# Patient Record
Sex: MALE | Race: WHITE | Employment: FULL TIME | URBAN - METROPOLITAN AREA
[De-identification: names, ages, dates, MRNs, and addresses within clinical notes are randomized per-mention and may not be internally consistent; named-entity substitution may affect disease eponyms.]

---

## 2018-04-12 RX ORDER — SCOLOPAMINE TRANSDERMAL SYSTEM 1 MG/1
1 PATCH, EXTENDED RELEASE TRANSDERMAL ONCE
Status: CANCELLED | OUTPATIENT
Start: 2018-04-12 | End: 2018-04-12

## 2018-04-12 RX ORDER — SODIUM CHLORIDE, SODIUM LACTATE, POTASSIUM CHLORIDE, CALCIUM CHLORIDE 600; 310; 30; 20 MG/100ML; MG/100ML; MG/100ML; MG/100ML
INJECTION, SOLUTION INTRAVENOUS CONTINUOUS
Status: CANCELLED | OUTPATIENT
Start: 2018-04-12

## 2018-04-12 RX ORDER — ALBUTEROL SULFATE 90 UG/1
AEROSOL, METERED RESPIRATORY (INHALATION) AS NEEDED
COMMUNITY
End: 2019-10-29

## 2018-04-12 NOTE — H&P
[address]    PATIENT'S NAME: Jose L [address]   ATTENDING PHYSICIAN: Lelo Fernandez M.D.    PATIENT ACCOUNT#:   [de-identified]    LOCATION:    MEDICAL RECORD #:   [id number]       YOB: 1965  ADMISSION DATE:       04/27/2018    HISTORY AND and jatinder. FAMILY HISTORY:  Negative. SOCIAL HISTORY:  The patient works as an electronics tech. He lives in [facility]. He is a former smoker. He smoked 2 packs a day for 25 years. He does use alcohol. He denies recreational drug use.

## 2018-04-13 ENCOUNTER — LABORATORY ENCOUNTER (OUTPATIENT)
Dept: LAB | Facility: HOSPITAL | Age: 53
End: 2018-04-13
Attending: ORTHOPAEDIC SURGERY
Payer: COMMERCIAL

## 2018-04-13 DIAGNOSIS — M17.11 OSTEOARTHRITIS OF RIGHT KNEE: ICD-10-CM

## 2018-04-13 PROCEDURE — 85025 COMPLETE CBC W/AUTO DIFF WBC: CPT

## 2018-04-13 PROCEDURE — 87081 CULTURE SCREEN ONLY: CPT

## 2018-04-13 PROCEDURE — 93010 ELECTROCARDIOGRAM REPORT: CPT | Performed by: INTERNAL MEDICINE

## 2018-04-13 PROCEDURE — 93005 ELECTROCARDIOGRAM TRACING: CPT

## 2018-04-13 PROCEDURE — 86901 BLOOD TYPING SEROLOGIC RH(D): CPT

## 2018-04-13 PROCEDURE — 80048 BASIC METABOLIC PNL TOTAL CA: CPT

## 2018-04-13 PROCEDURE — 36415 COLL VENOUS BLD VENIPUNCTURE: CPT

## 2018-04-13 PROCEDURE — 86900 BLOOD TYPING SEROLOGIC ABO: CPT

## 2018-04-13 PROCEDURE — 86850 RBC ANTIBODY SCREEN: CPT

## 2018-04-16 ENCOUNTER — ANESTHESIA EVENT (OUTPATIENT)
Dept: SURGERY | Facility: HOSPITAL | Age: 53
End: 2018-04-16

## 2018-04-27 ENCOUNTER — APPOINTMENT (OUTPATIENT)
Dept: GENERAL RADIOLOGY | Facility: HOSPITAL | Age: 53
DRG: 470 | End: 2018-04-27
Attending: ORTHOPAEDIC SURGERY
Payer: COMMERCIAL

## 2018-04-27 ENCOUNTER — HOSPITAL ENCOUNTER (INPATIENT)
Facility: HOSPITAL | Age: 53
LOS: 2 days | Discharge: HOME HEALTH | DRG: 470 | End: 2018-04-29
Attending: ORTHOPAEDIC SURGERY | Admitting: ORTHOPAEDIC SURGERY
Payer: COMMERCIAL

## 2018-04-27 ENCOUNTER — ANESTHESIA (OUTPATIENT)
Dept: SURGERY | Facility: HOSPITAL | Age: 53
End: 2018-04-27

## 2018-04-27 ENCOUNTER — SURGERY (OUTPATIENT)
Age: 53
End: 2018-04-27

## 2018-04-27 DIAGNOSIS — M17.11 OSTEOARTHRITIS OF RIGHT KNEE: Primary | ICD-10-CM

## 2018-04-27 DIAGNOSIS — M17.11 PRIMARY OSTEOARTHRITIS OF RIGHT KNEE: ICD-10-CM

## 2018-04-27 PROBLEM — F41.9 ANXIETY: Chronic | Status: ACTIVE | Noted: 2018-04-27

## 2018-04-27 PROBLEM — J43.8 OTHER EMPHYSEMA (HCC): Chronic | Status: ACTIVE | Noted: 2018-04-27

## 2018-04-27 PROBLEM — F33.40 RECURRENT MAJOR DEPRESSIVE DISORDER, IN REMISSION (HCC): Chronic | Status: ACTIVE | Noted: 2018-04-27

## 2018-04-27 PROBLEM — I10 ESSENTIAL HYPERTENSION: Chronic | Status: ACTIVE | Noted: 2018-04-27

## 2018-04-27 PROBLEM — K21.9 GASTROESOPHAGEAL REFLUX DISEASE WITHOUT ESOPHAGITIS: Chronic | Status: ACTIVE | Noted: 2018-04-27

## 2018-04-27 PROCEDURE — 73560 X-RAY EXAM OF KNEE 1 OR 2: CPT | Performed by: ORTHOPAEDIC SURGERY

## 2018-04-27 PROCEDURE — 0SRC0J9 REPLACEMENT OF RIGHT KNEE JOINT WITH SYNTHETIC SUBSTITUTE, CEMENTED, OPEN APPROACH: ICD-10-PCS | Performed by: ORTHOPAEDIC SURGERY

## 2018-04-27 PROCEDURE — 99233 SBSQ HOSP IP/OBS HIGH 50: CPT | Performed by: HOSPITALIST

## 2018-04-27 PROCEDURE — 3E0T3BZ INTRODUCTION OF ANESTHETIC AGENT INTO PERIPHERAL NERVES AND PLEXI, PERCUTANEOUS APPROACH: ICD-10-PCS | Performed by: ANESTHESIOLOGY

## 2018-04-27 DEVICE — PSN TIB STM 5 DEG SZ G R: Type: IMPLANTABLE DEVICE | Site: KNEE | Status: FUNCTIONAL

## 2018-04-27 DEVICE — ALL POLY PAT VE 35MM: Type: IMPLANTABLE DEVICE | Site: KNEE | Status: FUNCTIONAL

## 2018-04-27 DEVICE — PSN FEM PS CMT CCR STD SZ9 R: Type: IMPLANTABLE DEVICE | Site: KNEE | Status: FUNCTIONAL

## 2018-04-27 DEVICE — CEMENT BONE ZIM PALICOS R: Type: IMPLANTABLE DEVICE | Site: KNEE | Status: FUNCTIONAL

## 2018-04-27 DEVICE — PSN STR HYB ST 14X+30 M: Type: IMPLANTABLE DEVICE | Site: KNEE | Status: FUNCTIONAL

## 2018-04-27 RX ORDER — ONDANSETRON 2 MG/ML
4 INJECTION INTRAMUSCULAR; INTRAVENOUS EVERY 4 HOURS PRN
Status: DISCONTINUED | OUTPATIENT
Start: 2018-04-27 | End: 2018-04-29

## 2018-04-27 RX ORDER — ALPRAZOLAM 1 MG/1
1 TABLET ORAL EVERY 8 HOURS PRN
COMMUNITY
End: 2019-12-04

## 2018-04-27 RX ORDER — METOPROLOL TARTRATE 50 MG/1
50 TABLET, FILM COATED ORAL SEE ADMIN INSTRUCTIONS
Status: DISCONTINUED | OUTPATIENT
Start: 2018-04-27 | End: 2018-04-28

## 2018-04-27 RX ORDER — METOPROLOL TARTRATE 50 MG/1
50 TABLET, FILM COATED ORAL SEE ADMIN INSTRUCTIONS
COMMUNITY
End: 2018-12-04 | Stop reason: ALTCHOICE

## 2018-04-27 RX ORDER — SENNOSIDES 8.6 MG
17.2 TABLET ORAL NIGHTLY
Status: DISCONTINUED | OUTPATIENT
Start: 2018-04-27 | End: 2018-04-29

## 2018-04-27 RX ORDER — IPRATROPIUM BROMIDE AND ALBUTEROL SULFATE 2.5; .5 MG/3ML; MG/3ML
3 SOLUTION RESPIRATORY (INHALATION) EVERY 6 HOURS PRN
Status: DISCONTINUED | OUTPATIENT
Start: 2018-04-27 | End: 2018-04-29

## 2018-04-27 RX ORDER — DIPHENHYDRAMINE HYDROCHLORIDE 50 MG/ML
25 INJECTION INTRAMUSCULAR; INTRAVENOUS ONCE AS NEEDED
Status: ACTIVE | OUTPATIENT
Start: 2018-04-27 | End: 2018-04-27

## 2018-04-27 RX ORDER — POLYETHYLENE GLYCOL 3350 17 G/17G
17 POWDER, FOR SOLUTION ORAL DAILY PRN
Status: DISCONTINUED | OUTPATIENT
Start: 2018-04-27 | End: 2018-04-29

## 2018-04-27 RX ORDER — OXYCODONE HYDROCHLORIDE 5 MG/1
5 TABLET ORAL EVERY 4 HOURS PRN
Status: ACTIVE | OUTPATIENT
Start: 2018-04-27 | End: 2018-04-29

## 2018-04-27 RX ORDER — HYDROCODONE BITARTRATE AND ACETAMINOPHEN 10; 325 MG/1; MG/1
1-2 TABLET ORAL
Qty: 80 TABLET | Refills: 0 | Status: SHIPPED | OUTPATIENT
Start: 2018-04-27 | End: 2018-12-04

## 2018-04-27 RX ORDER — MORPHINE SULFATE 4 MG/ML
1 INJECTION, SOLUTION INTRAMUSCULAR; INTRAVENOUS EVERY 2 HOUR PRN
Status: DISCONTINUED | OUTPATIENT
Start: 2018-04-27 | End: 2018-04-29

## 2018-04-27 RX ORDER — DIPHENHYDRAMINE HYDROCHLORIDE 50 MG/ML
12.5 INJECTION INTRAMUSCULAR; INTRAVENOUS EVERY 4 HOURS PRN
Status: DISCONTINUED | OUTPATIENT
Start: 2018-04-27 | End: 2018-04-29

## 2018-04-27 RX ORDER — LISINOPRIL AND HYDROCHLOROTHIAZIDE 25; 20 MG/1; MG/1
1 TABLET ORAL DAILY
Status: DISCONTINUED | OUTPATIENT
Start: 2018-04-27 | End: 2018-04-27 | Stop reason: SDUPTHER

## 2018-04-27 RX ORDER — MELATONIN
325
Status: DISCONTINUED | OUTPATIENT
Start: 2018-04-28 | End: 2018-04-29

## 2018-04-27 RX ORDER — ALPRAZOLAM 1 MG/1
1 TABLET ORAL EVERY 8 HOURS PRN
Status: DISCONTINUED | OUTPATIENT
Start: 2018-04-27 | End: 2018-04-29

## 2018-04-27 RX ORDER — SERTRALINE HYDROCHLORIDE 100 MG/1
100 TABLET, FILM COATED ORAL DAILY
Status: DISCONTINUED | OUTPATIENT
Start: 2018-04-28 | End: 2018-04-29

## 2018-04-27 RX ORDER — OXYCODONE HCL 10 MG/1
10 TABLET, FILM COATED, EXTENDED RELEASE ORAL EVERY 12 HOURS
Status: DISCONTINUED | OUTPATIENT
Start: 2018-04-27 | End: 2018-04-27 | Stop reason: HOSPADM

## 2018-04-27 RX ORDER — OXYCODONE HYDROCHLORIDE 10 MG/1
10 TABLET ORAL EVERY 4 HOURS PRN
Status: DISPENSED | OUTPATIENT
Start: 2018-04-27 | End: 2018-04-29

## 2018-04-27 RX ORDER — METOCLOPRAMIDE HYDROCHLORIDE 5 MG/ML
10 INJECTION INTRAMUSCULAR; INTRAVENOUS EVERY 6 HOURS PRN
Status: DISCONTINUED | OUTPATIENT
Start: 2018-04-27 | End: 2018-04-29

## 2018-04-27 RX ORDER — OXYCODONE HYDROCHLORIDE 15 MG/1
15 TABLET ORAL EVERY 4 HOURS PRN
Status: DISPENSED | OUTPATIENT
Start: 2018-04-27 | End: 2018-04-29

## 2018-04-27 RX ORDER — ONDANSETRON 2 MG/ML
4 INJECTION INTRAMUSCULAR; INTRAVENOUS AS NEEDED
Status: DISCONTINUED | OUTPATIENT
Start: 2018-04-27 | End: 2018-04-27 | Stop reason: HOSPADM

## 2018-04-27 RX ORDER — MIDAZOLAM HYDROCHLORIDE 1 MG/ML
1 INJECTION INTRAMUSCULAR; INTRAVENOUS EVERY 5 MIN PRN
Status: DISCONTINUED | OUTPATIENT
Start: 2018-04-27 | End: 2018-04-27 | Stop reason: HOSPADM

## 2018-04-27 RX ORDER — OXYCODONE HCL 10 MG/1
TABLET, FILM COATED, EXTENDED RELEASE ORAL
Status: COMPLETED
Start: 2018-04-27 | End: 2018-04-27

## 2018-04-27 RX ORDER — KETOROLAC TROMETHAMINE 30 MG/ML
30 INJECTION, SOLUTION INTRAMUSCULAR; INTRAVENOUS EVERY 6 HOURS
Status: DISCONTINUED | OUTPATIENT
Start: 2018-04-27 | End: 2018-04-28

## 2018-04-27 RX ORDER — ASPIRIN 81 MG/1
81 TABLET, CHEWABLE ORAL DAILY
Status: DISCONTINUED | OUTPATIENT
Start: 2018-04-27 | End: 2018-04-29

## 2018-04-27 RX ORDER — BISACODYL 10 MG
10 SUPPOSITORY, RECTAL RECTAL
Status: DISCONTINUED | OUTPATIENT
Start: 2018-04-27 | End: 2018-04-29

## 2018-04-27 RX ORDER — METOCLOPRAMIDE HYDROCHLORIDE 5 MG/ML
10 INJECTION INTRAMUSCULAR; INTRAVENOUS AS NEEDED
Status: DISCONTINUED | OUTPATIENT
Start: 2018-04-27 | End: 2018-04-27 | Stop reason: HOSPADM

## 2018-04-27 RX ORDER — DIPHENHYDRAMINE HYDROCHLORIDE 50 MG/ML
12.5 INJECTION INTRAMUSCULAR; INTRAVENOUS AS NEEDED
Status: DISCONTINUED | OUTPATIENT
Start: 2018-04-27 | End: 2018-04-27 | Stop reason: HOSPADM

## 2018-04-27 RX ORDER — PANTOPRAZOLE SODIUM 40 MG/1
40 TABLET, DELAYED RELEASE ORAL
Status: DISCONTINUED | OUTPATIENT
Start: 2018-04-28 | End: 2018-04-29

## 2018-04-27 RX ORDER — SODIUM CHLORIDE, SODIUM LACTATE, POTASSIUM CHLORIDE, CALCIUM CHLORIDE 600; 310; 30; 20 MG/100ML; MG/100ML; MG/100ML; MG/100ML
INJECTION, SOLUTION INTRAVENOUS CONTINUOUS
Status: DISCONTINUED | OUTPATIENT
Start: 2018-04-27 | End: 2018-04-29

## 2018-04-27 RX ORDER — DEXTROSE AND SODIUM CHLORIDE 5; .45 G/100ML; G/100ML
INJECTION, SOLUTION INTRAVENOUS CONTINUOUS
Status: DISCONTINUED | OUTPATIENT
Start: 2018-04-27 | End: 2018-04-29

## 2018-04-27 RX ORDER — TIZANIDINE 4 MG/1
4 TABLET ORAL 3 TIMES DAILY PRN
Status: DISCONTINUED | OUTPATIENT
Start: 2018-04-27 | End: 2018-04-29

## 2018-04-27 RX ORDER — SCOLOPAMINE TRANSDERMAL SYSTEM 1 MG/1
1 PATCH, EXTENDED RELEASE TRANSDERMAL ONCE
Status: DISCONTINUED | OUTPATIENT
Start: 2018-04-27 | End: 2018-04-29

## 2018-04-27 RX ORDER — ACETAMINOPHEN 500 MG
1000 TABLET ORAL ONCE
COMMUNITY
End: 2018-12-04

## 2018-04-27 RX ORDER — DIPHENHYDRAMINE HCL 25 MG
25 CAPSULE ORAL EVERY 4 HOURS PRN
Status: DISCONTINUED | OUTPATIENT
Start: 2018-04-27 | End: 2018-04-29

## 2018-04-27 RX ORDER — CEFAZOLIN SODIUM/WATER 2 G/20 ML
2 SYRINGE (ML) INTRAVENOUS ONCE
Status: COMPLETED | OUTPATIENT
Start: 2018-04-27 | End: 2018-04-27

## 2018-04-27 RX ORDER — MEPERIDINE HYDROCHLORIDE 25 MG/ML
12.5 INJECTION INTRAMUSCULAR; INTRAVENOUS; SUBCUTANEOUS AS NEEDED
Status: DISCONTINUED | OUTPATIENT
Start: 2018-04-27 | End: 2018-04-27 | Stop reason: HOSPADM

## 2018-04-27 RX ORDER — SERTRALINE HYDROCHLORIDE 100 MG/1
100 TABLET, FILM COATED ORAL DAILY
COMMUNITY
End: 2020-04-02

## 2018-04-27 RX ORDER — MORPHINE SULFATE 4 MG/ML
2 INJECTION, SOLUTION INTRAMUSCULAR; INTRAVENOUS EVERY 2 HOUR PRN
Status: DISCONTINUED | OUTPATIENT
Start: 2018-04-27 | End: 2018-04-29

## 2018-04-27 RX ORDER — DOCUSATE SODIUM 100 MG/1
100 CAPSULE, LIQUID FILLED ORAL 2 TIMES DAILY
Status: DISCONTINUED | OUTPATIENT
Start: 2018-04-27 | End: 2018-04-29

## 2018-04-27 RX ORDER — SODIUM PHOSPHATE, DIBASIC AND SODIUM PHOSPHATE, MONOBASIC 7; 19 G/133ML; G/133ML
1 ENEMA RECTAL ONCE AS NEEDED
Status: DISCONTINUED | OUTPATIENT
Start: 2018-04-27 | End: 2018-04-29

## 2018-04-27 RX ORDER — SODIUM CHLORIDE, SODIUM LACTATE, POTASSIUM CHLORIDE, CALCIUM CHLORIDE 600; 310; 30; 20 MG/100ML; MG/100ML; MG/100ML; MG/100ML
INJECTION, SOLUTION INTRAVENOUS CONTINUOUS
Status: DISCONTINUED | OUTPATIENT
Start: 2018-04-27 | End: 2018-04-27 | Stop reason: HOSPADM

## 2018-04-27 RX ORDER — ACETAMINOPHEN 325 MG/1
650 TABLET ORAL 4 TIMES DAILY
Status: COMPLETED | OUTPATIENT
Start: 2018-04-27 | End: 2018-04-28

## 2018-04-27 RX ORDER — ACETAMINOPHEN 500 MG
1000 TABLET ORAL ONCE
Status: DISCONTINUED | OUTPATIENT
Start: 2018-04-27 | End: 2018-04-27 | Stop reason: HOSPADM

## 2018-04-27 RX ORDER — NALOXONE HYDROCHLORIDE 0.4 MG/ML
80 INJECTION, SOLUTION INTRAMUSCULAR; INTRAVENOUS; SUBCUTANEOUS AS NEEDED
Status: DISCONTINUED | OUTPATIENT
Start: 2018-04-27 | End: 2018-04-27 | Stop reason: HOSPADM

## 2018-04-27 NOTE — INTERVAL H&P NOTE
Pre-op Diagnosis: Primary osteoarthritis of right knee [M17.11]    The above referenced H&P was reviewed by Harris Mas NP on 4/27/2018, the patient was examined and no significant changes have occurred in the patient's condition since the H&P was perfo

## 2018-04-27 NOTE — ANESTHESIA POSTPROCEDURE EVALUATION
Carla [facility] [facility] Patient Status:  Surgery Admit   Age/Gender 45year old male MRN [id number]   [place] [facility] Attending Diane Pedersen MD   Hosp Day # 0 PCP Beryl Castillo DO       Anesthesia Post-op Note    Procedur

## 2018-04-27 NOTE — BRIEF OP NOTE
Pre-Operative Diagnosis: Primary osteoarthritis of right knee [M17.11]     Post-Operative Diagnosis: Primary osteoarthritis of right knee [M17.11]      Procedure Performed:   Procedure(s):  RIGHT TOTAL KNEE ARTHROPLASTY    Surgeon(s) and Role:     Francisco J Akins,

## 2018-04-27 NOTE — ANESTHESIA PREPROCEDURE EVALUATION
PRE-OP EVALUATION    Patient Name: Alethea Busby    Pre-op Diagnosis: Primary osteoarthritis of right knee [M17.11]    Procedure(s):  RIGHT TOTAL KNEE ARTHROPLASTY    Surgeon(s) and Role:     Filomena Victoria MD - Primary    Pre-op vitals reviewed. Allergies: Dilaudid [Hydromorphone]      Anesthesia Evaluation    Patient summary reviewed. Anesthetic Complications  (-) history of anesthetic complications         GI/Hepatic/Renal    Negative GI/hepatic/renal ROS.                              Anay Mcgregor ASA: 3   Plan: regional and spinal  NPO status verified and patient meets guidelines. Patient has taken beta blockers in last 24 hours. Post-procedure pain management plan discussed with surgeon and patient.   Surgeon requests: regional block  C

## 2018-04-28 PROCEDURE — 99232 SBSQ HOSP IP/OBS MODERATE 35: CPT | Performed by: HOSPITALIST

## 2018-04-28 RX ORDER — HYDROCODONE BITARTRATE AND ACETAMINOPHEN 10; 325 MG/1; MG/1
2 TABLET ORAL EVERY 4 HOURS PRN
Status: DISCONTINUED | OUTPATIENT
Start: 2018-04-29 | End: 2018-04-29

## 2018-04-28 RX ORDER — HYDROCODONE BITARTRATE AND ACETAMINOPHEN 10; 325 MG/1; MG/1
1 TABLET ORAL EVERY 4 HOURS PRN
Status: DISCONTINUED | OUTPATIENT
Start: 2018-04-29 | End: 2018-04-29

## 2018-04-28 RX ORDER — IPRATROPIUM BROMIDE AND ALBUTEROL SULFATE 2.5; .5 MG/3ML; MG/3ML
3 SOLUTION RESPIRATORY (INHALATION)
Status: DISCONTINUED | OUTPATIENT
Start: 2018-04-28 | End: 2018-04-28

## 2018-04-28 RX ORDER — KETOROLAC TROMETHAMINE 30 MG/ML
30 INJECTION, SOLUTION INTRAMUSCULAR; INTRAVENOUS ONCE
Status: COMPLETED | OUTPATIENT
Start: 2018-04-28 | End: 2018-04-28

## 2018-04-28 NOTE — PROGRESS NOTES
[facility]  Progress Note    Martha Munguia Patient Status:  Inpatient    1965 MRN [id number]   [facility] 3SW-A Attending Maria Antonia Johnson MD   [facility] Day # 1 PCP Wilhemenia Jeans, DO     SUBJECTIVE:  INTERVAL HISTORY: S/P  1  Pro

## 2018-04-28 NOTE — PHYSICAL THERAPY NOTE
PHYSICAL THERAPY KNEE TREATMENT NOTE - INPATIENT     Room Number: 360/360-A     Session: 1 and 2   Number of Visits to Meet Established Goals: 5    Presenting Problem: S/P right TKR    Problem List  Active Problems:    Essential hypertension    Gastroesop shortness of breath    AM-PAC '6-Clicks' INPATIENT SHORT FORM - BASIC MOBILITY  How much difficulty does the patient currently have. ..  -   Turning over in bed (including adjusting bedclothes, sheets and blankets)?: None   -   Sitting down on and standing reps 15 reps   Standing knee flexion 10 reps 15 reps   Extension stretch  1x 1x     Comments: Pt participated in group session, tolerance was good.    was present no   is a therapist    Knee ROM   R Knee Flexion (degrees): 85     R Knee Extension

## 2018-04-28 NOTE — RESPIRATORY THERAPY NOTE
ZEB Equipment Usage Summary :            Set Mode :AUTO CPAP W FLEX          Usage in Hours:2;47          90% Pressure (EPAP) : 7.7           90% Insp Pressure (IPAP);           AHI : 1.8          Supplemental Oxygen :      LPM

## 2018-04-28 NOTE — PHYSICAL THERAPY NOTE
PHYSICAL THERAPY KNEE EVALUATION - INPATIENT     Room Number: 360/360-A  Evaluation Date: 4/28/2018  Type of Evaluation: Initial  Physician Order: PT Eval and Treat    Presenting Problem: S/P right TKR  Reason for Therapy: Mobility Dysfunction and Discharg feeling pretty good    Patient self-stated goal is to get stronger and go home    OBJECTIVE     Fall Risk: Standard fall risk    WEIGHT BEARING RESTRICTION  Weight Bearing Restriction: R lower extremity        R Lower Extremity: Weight Bearing as Tolerated definitions    Skilled Therapy Provided: Co-treat for evaluation with OT. Performed bed mobility, transfers and ambulation. The patient was able to perform supine to sit bed mobility with supervision.  The patient was able to perform sit to stand transfers of motion;Strengthening;Stoop training;Stair training;Transfer training;Balance training  Rehab Potential : Good  Frequency (Obs): BID  Number of Visits to Meet Established Goals: 5      CURRENT GOALS   Goal #1    Patient is able to demonstrate supine - si

## 2018-04-28 NOTE — CM/SW NOTE
Pt is a 44 yo male admitted for right knee replacement. Pt lives with his wife and 4 children ages 22,19, 5years old twins. Pt was independent for his adls and was walking unassisted. Pt works full time. Pt's wife is requesting [facility].   Referral

## 2018-04-28 NOTE — PLAN OF CARE
PAIN - ADULT    • Verbalizes/displays adequate comfort level or patient's stated pain goal Progressing        RESPIRATORY - ADULT    • Achieves optimal ventilation and oxygenation Progressing        Verbalized having so much pain to right knee when up and

## 2018-04-28 NOTE — PROGRESS NOTES
[facility]  Progress Note     Sara Munguia Patient Status:  Inpatient    1965 MRN [id number]   [facility] 3SW-A Attending Lebron Freed MD   Hosp Day # 1 PCP Charanjit Tello DO     Chief Complaint: Medical Management Schedule nebs  3. HTN:  Hold Ace inhibitor  4.  GERD    Plan of care: As above    Quality:  · DVT Prophylaxis: eliquis  · CODE status: Full Code  · Watson: None  · Central line: None    Estimated date of discharge: 1-2 days  Discharge is dependent on: Mirella

## 2018-04-28 NOTE — CONSULTS
[facility]  [address] Patient Status:  Inpatient    1965 MRN [id number]   [facility] 3SW-A Attending Cody Cervantes MD   Hosp Day # 0 PCP Bandar Osborn DO     Reason for consult: Medical management reports that he has been smoking. He has a 50.00 pack-year smoking history. He uses smokeless tobacco. He reports that he drinks alcohol. He reports that he does not use drugs. Family History: History reviewed. No pertinent family history.     Allergies CL, CO2, ALKPHO, AST, ALT, BILT, TP in the last 72 hours. No results for input(s): PTP, INR in the last 72 hours. No results for input(s): TROP, CK in the last 72 hours. Imaging: Imaging data reviewed in Epic. ASSESSMENT / PLAN:     1.  Rt TKA

## 2018-04-28 NOTE — OCCUPATIONAL THERAPY NOTE
OCCUPATIONAL THERAPY QUICK EVALUATION - INPATIENT    Room Number: 360/360-A  Evaluation Date: 4/28/2018     Type of Evaluation: Quick Eval  Presenting Problem: R TKR    Physician Order: IP Consult to Occupational Therapy  Reason for Therapy:  ADL/IADL Dysf Regularly Uses: Glasses    Prior Level of Function: independent with ADL, IADL. 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 SUBJECTIVE   \"My wife keeps me in check. She tells me to slow down. \"      OBJECTIVE     Fall Risk: High fall risk    WEIGH chair;Needs met;Call light within reach; All patient questions and concerns addressed;SCDs in place; Ice applied    ASSESSMENT     Patient is a 45year old male admitted on 4/27/2018 for R TKR. Complete medical history and occupational profile noted above.  Dontae James

## 2018-04-29 VITALS
RESPIRATION RATE: 18 BRPM | TEMPERATURE: 98 F | HEIGHT: 66 IN | WEIGHT: 253 LBS | BODY MASS INDEX: 40.66 KG/M2 | DIASTOLIC BLOOD PRESSURE: 69 MMHG | OXYGEN SATURATION: 95 % | HEART RATE: 75 BPM | SYSTOLIC BLOOD PRESSURE: 115 MMHG

## 2018-04-29 PROCEDURE — 99232 SBSQ HOSP IP/OBS MODERATE 35: CPT | Performed by: HOSPITALIST

## 2018-04-29 NOTE — PHYSICAL THERAPY NOTE
PHYSICAL THERAPY KNEE TREATMENT NOTE - INPATIENT     Room Number: 360/360-A     Session: 3  Number of Visits to Meet Established Goals: 5    Presenting Problem: S/P right TKR    Problem List  Active Problems:    Essential hypertension    Gastroesophageal breath    AM-PAC '6-Clicks' INPATIENT SHORT FORM - BASIC MOBILITY  How much difficulty does the patient currently have. ..  -   Turning over in bed (including adjusting bedclothes, sheets and blankets)?: None   -   Sitting down on and standing up from a phi lunch)    ASSESSMENT   This pt is s/p R TKA.   This pt presents with the following: decreased strength, decreased ROM, decreased endurance,and decreased pain management and therefore will benefit from skilled physical therapy services to address these defic

## 2018-04-29 NOTE — PROGRESS NOTES
[facility]  Progress Note     Dorothy Loveanastacio Munguia Patient Status:  Inpatient    1965 MRN [id number]   [facility] 3SW-A Attending Estrellita Lawrence MD   Hosp Day # 2 PCP Truman Ng DO     Chief Complaint: Medical Management Schedule nebs  3. HTN:  Resume ACE tomorrow  4. GERD    Plan of care: As above    Quality:  · DVT Prophylaxis: eliquis  · CODE status: Full Code  · Watson: None  · Central line: None    Estimated date of discharge:  Today  Discharge is dependent on: Progress

## 2018-04-29 NOTE — PROGRESS NOTES
Orthopedic surgery progress note    Schalisa Munguia Patient Status:  Inpatient    1965 MRN [id number]   [facility] 3SW-A Attending Carin Argueta MD   Hosp Day # 2 PCP Shirley Sky DO       Subjective:  Pain level was worse yest

## 2018-04-29 NOTE — PLAN OF CARE
PAIN - ADULT    • Verbalizes/displays adequate comfort level or patient's stated pain goal Adequate for Discharge        RESPIRATORY - ADULT    • Achieves optimal ventilation and oxygenation Adequate for Discharge        Patient pain level much better toda

## 2018-04-29 NOTE — PROGRESS NOTES
Acute Pain Service    Post Op Day 2 Ortho Note    Assessed patient in bed. Patient rates pain 6/10 at this time and \"getting worse\". Patient taking Norco to manage pain; denies itching/nausea/dizziness.  Last Norco dose was at [address]; pt states [place] worked

## 2018-04-29 NOTE — RESPIRATORY THERAPY NOTE
ZEB Equipment Usage Summary :            Set Mode :AUTO CPAP W FLEX          Usage in Hours:5;09          90% Pressure (EPAP) : 11           90% Insp Pressure (IPAP);           AHI : 0.2          Supplemental Oxygen :      LPM

## 2018-04-29 NOTE — PROGRESS NOTES
NURSING DISCHARGE NOTE    Discharged to home with referral to [address] [place] [address] via wheelchair. .  Accompanied by his wife. Belongings packed and sent home with patient. Discharge instructions discussed with patient, her verbalized understanding.   [place]

## 2018-04-29 NOTE — CM/SW NOTE
SW alerted of anticipated discharge today with family anticipated to provide transport. Discharge AVS sent via [facility] to [facility]. RN updated. / to remain available for support and/or discharge planning.        [facility]  [phone]

## 2018-04-30 PROBLEM — Z47.89 ORTHOPEDIC AFTERCARE: Status: ACTIVE | Noted: 2018-04-30

## 2018-04-30 NOTE — OPERATIVE REPORT
[address]    PATIENT'S NAME: [address] [facility]   ATTENDING PHYSICIAN: Tamiko New M.D. OPERATING PHYSICIAN: Tamiko New M.D.    PATIENT ACCOUNT#:   [de-identified]    LOCATION:  [address] [place] [address]  MEDICAL RECORD #:   [id number]       DATE OF BIRTH: along the medial tibial plateau and grade 3 changes of the medial femoral condyle. Degenerative changes were seen in the intercondylar groove, grade 3-4, and the patellar articular surface had grade 1-2 chondromalacia changes.   The infrapatellar fat pad w of the lateral meniscus. The intramedullary device was placed down the drill hole.   The outrigger was attached to the intramedullary device and rotated externally to match the natural external rotation of the tibial tubercle and the tibial crest.  Depth g tray.  The patient had a BMI of 40.84, and I used the stem extension. The bone was prepared with pulsatile lavage. Palacos R cement was mixed on the back table, and the final components were chosen.   A 14 x 30 mm stem extension was cemented into a size G

## 2018-04-30 NOTE — CM/SW NOTE
04/30/18 1300   Discharge disposition   Expected discharge disposition Home-Health   [facility]   Discharge transportation Private car   DC 4/29/18

## 2018-04-30 NOTE — DISCHARGE SUMMARY
Discharge Summary  Patient ID:  Tegan Jung  [id number]  45year old  8/27/1965    Admit date: 4/27/2018    Discharge date and time: 4/29/18    Attending Physician: No att. providers found     Reason for admission: Primary osteoarthritis of right kne

## 2018-05-11 PROBLEM — Z96.651 STATUS POST RIGHT KNEE REPLACEMENT: Status: ACTIVE | Noted: 2018-05-11

## 2018-08-16 ENCOUNTER — HOSPITAL ENCOUNTER (EMERGENCY)
Facility: HOSPITAL | Age: 53
Discharge: HOME | End: 2018-08-16
Attending: EMERGENCY MEDICINE
Payer: COMMERCIAL

## 2018-08-16 ENCOUNTER — APPOINTMENT (OUTPATIENT)
Dept: CT IMAGING | Facility: HOSPITAL | Age: 53
End: 2018-08-16
Attending: EMERGENCY MEDICINE
Payer: COMMERCIAL

## 2018-08-16 VITALS
HEART RATE: 78 BPM | SYSTOLIC BLOOD PRESSURE: 117 MMHG | OXYGEN SATURATION: 96 % | RESPIRATION RATE: 14 BRPM | HEIGHT: 67 IN | DIASTOLIC BLOOD PRESSURE: 79 MMHG | BODY MASS INDEX: 37.67 KG/M2 | TEMPERATURE: 99 F | WEIGHT: 240 LBS

## 2018-08-16 DIAGNOSIS — R10.30 LOWER ABDOMINAL PAIN: Primary | ICD-10-CM

## 2018-08-16 LAB
ALBUMIN SERPL-MCNC: 3.6 G/DL (ref 3.5–4.8)
ALBUMIN/GLOB SERPL: 0.8 {RATIO} (ref 1–2)
ALP LIVER SERPL-CCNC: 88 U/L (ref 45–117)
ALT SERPL-CCNC: 21 U/L (ref 17–63)
ANION GAP SERPL CALC-SCNC: 6 MMOL/L (ref 0–18)
AST SERPL-CCNC: 23 U/L (ref 15–41)
BASOPHILS # BLD AUTO: 0.04 X10(3) UL (ref 0–0.1)
BASOPHILS NFR BLD AUTO: 0.6 %
BILIRUB SERPL-MCNC: 0.3 MG/DL (ref 0.1–2)
BILIRUB UR QL STRIP.AUTO: NEGATIVE
BUN BLD-MCNC: 32 MG/DL (ref 8–20)
BUN/CREAT SERPL: 16.2 (ref 10–20)
CALCIUM BLD-MCNC: 9 MG/DL (ref 8.3–10.3)
CHLORIDE SERPL-SCNC: 100 MMOL/L (ref 101–111)
CLARITY UR REFRACT.AUTO: CLEAR
CO2 SERPL-SCNC: 29 MMOL/L (ref 22–32)
COLOR UR AUTO: YELLOW
CREAT BLD-MCNC: 1.97 MG/DL (ref 0.7–1.3)
EOSINOPHIL # BLD AUTO: 0.06 X10(3) UL (ref 0–0.3)
EOSINOPHIL NFR BLD AUTO: 0.9 %
ERYTHROCYTE [DISTWIDTH] IN BLOOD BY AUTOMATED COUNT: 13.9 % (ref 11.5–16)
GLOBULIN PLAS-MCNC: 4.4 G/DL (ref 2.5–4)
GLUCOSE BLD-MCNC: 108 MG/DL (ref 70–99)
GLUCOSE UR STRIP.AUTO-MCNC: NEGATIVE MG/DL
HCT VFR BLD AUTO: 46.6 % (ref 37–53)
HGB BLD-MCNC: 15.2 G/DL (ref 13–17)
IMMATURE GRANULOCYTE COUNT: 0.02 X10(3) UL (ref 0–1)
IMMATURE GRANULOCYTE RATIO %: 0.3 %
KETONES UR STRIP.AUTO-MCNC: NEGATIVE MG/DL
LEUKOCYTE ESTERASE UR QL STRIP.AUTO: NEGATIVE
LIPASE: 297 U/L (ref 73–393)
LYMPHOCYTES # BLD AUTO: 1.5 X10(3) UL (ref 0.9–4)
LYMPHOCYTES NFR BLD AUTO: 21.5 %
M PROTEIN MFR SERPL ELPH: 8 G/DL (ref 6.1–8.3)
MCH RBC QN AUTO: 29.1 PG (ref 27–33.2)
MCHC RBC AUTO-ENTMCNC: 32.6 G/DL (ref 31–37)
MCV RBC AUTO: 89.1 FL (ref 80–99)
MONOCYTES # BLD AUTO: 0.67 X10(3) UL (ref 0.1–1)
MONOCYTES NFR BLD AUTO: 9.6 %
NEUTROPHIL ABS PRELIM: 4.68 X10 (3) UL (ref 1.3–6.7)
NEUTROPHILS # BLD AUTO: 4.68 X10(3) UL (ref 1.3–6.7)
NEUTROPHILS NFR BLD AUTO: 67.1 %
NITRITE UR QL STRIP.AUTO: NEGATIVE
OSMOLALITY SERPL CALC.SUM OF ELEC: 287 MOSM/KG (ref 275–295)
PH UR STRIP.AUTO: 5 [PH] (ref 4.5–8)
PLATELET # BLD AUTO: 186 10(3)UL (ref 150–450)
POTASSIUM SERPL-SCNC: 4.1 MMOL/L (ref 3.6–5.1)
PROT UR STRIP.AUTO-MCNC: NEGATIVE MG/DL
RBC # BLD AUTO: 5.23 X10(6)UL (ref 4.3–5.7)
RBC UR QL AUTO: NEGATIVE
RED CELL DISTRIBUTION WIDTH-SD: 45.1 FL (ref 35.1–46.3)
SODIUM SERPL-SCNC: 135 MMOL/L (ref 136–144)
SP GR UR STRIP.AUTO: 1.02 (ref 1–1.03)
UROBILINOGEN UR STRIP.AUTO-MCNC: 4 MG/DL
WBC # BLD AUTO: 7 X10(3) UL (ref 4–13)

## 2018-08-16 PROCEDURE — 99284 EMERGENCY DEPT VISIT MOD MDM: CPT

## 2018-08-16 PROCEDURE — 74177 CT ABD & PELVIS W/CONTRAST: CPT | Performed by: EMERGENCY MEDICINE

## 2018-08-16 PROCEDURE — 85025 COMPLETE CBC W/AUTO DIFF WBC: CPT | Performed by: EMERGENCY MEDICINE

## 2018-08-16 PROCEDURE — 83690 ASSAY OF LIPASE: CPT | Performed by: EMERGENCY MEDICINE

## 2018-08-16 PROCEDURE — 96361 HYDRATE IV INFUSION ADD-ON: CPT

## 2018-08-16 PROCEDURE — 80053 COMPREHEN METABOLIC PANEL: CPT | Performed by: EMERGENCY MEDICINE

## 2018-08-16 PROCEDURE — 96360 HYDRATION IV INFUSION INIT: CPT

## 2018-08-16 PROCEDURE — 81003 URINALYSIS AUTO W/O SCOPE: CPT | Performed by: EMERGENCY MEDICINE

## 2018-08-16 RX ORDER — ONDANSETRON 2 MG/ML
4 INJECTION INTRAMUSCULAR; INTRAVENOUS ONCE
Status: DISCONTINUED | OUTPATIENT
Start: 2018-08-16 | End: 2018-08-16

## 2018-08-16 RX ORDER — SODIUM CHLORIDE 9 MG/ML
INJECTION, SOLUTION INTRAVENOUS CONTINUOUS
Status: DISCONTINUED | OUTPATIENT
Start: 2018-08-16 | End: 2018-08-16

## 2018-08-17 NOTE — ED PROVIDER NOTES
Patient Seen in: [facility] Emergency Department    History   Patient presents with:  Abdomen/Flank Pain (GI/)    Stated Complaint: fever, rlq pain    HPI    Patient has had some lower abdominal pain since yesterday that is now favoring the right 1723]  BP: 142/78  Pulse: 90  Resp: 18  Temp: 99.3 °F (37.4 °C)  Temp src: Temporal  SpO2: 96 %  O2 Device: n/a    Current:/72   Pulse 77   Temp 99.3 °F (37.4 °C) (Temporal)   Resp 16   Ht 170.2 cm (5' 7\")   Wt 108.9 kg   SpO2 96%   BMI 37.59 kg/m² CBC W/ DIFFERENTIAL[[id number]]                              Final result                 Please view results for these tests on the individual orders.    RAINBOW DRAW BLUE   RAINBOW DRAW LAVENDER   RAINBOW DRAW LIGHT GREEN   RAINBOW DRAW GOLD

## 2018-12-04 ENCOUNTER — EKG ENCOUNTER (OUTPATIENT)
Dept: LAB | Facility: HOSPITAL | Age: 53
End: 2018-12-04
Attending: FAMILY MEDICINE
Payer: COMMERCIAL

## 2018-12-04 DIAGNOSIS — Z01.812 PRE-OPERATIVE LABORATORY EXAMINATION: Primary | ICD-10-CM

## 2018-12-04 PROCEDURE — 93010 ELECTROCARDIOGRAM REPORT: CPT | Performed by: INTERNAL MEDICINE

## 2018-12-04 PROCEDURE — 93005 ELECTROCARDIOGRAM TRACING: CPT

## 2018-12-05 ENCOUNTER — LABORATORY ENCOUNTER (OUTPATIENT)
Dept: LAB | Facility: HOSPITAL | Age: 53
End: 2018-12-05
Payer: COMMERCIAL

## 2018-12-05 DIAGNOSIS — M16.11 PRIMARY OSTEOARTHRITIS OF RIGHT HIP: ICD-10-CM

## 2018-12-05 PROCEDURE — 87081 CULTURE SCREEN ONLY: CPT

## 2018-12-05 PROCEDURE — 36415 COLL VENOUS BLD VENIPUNCTURE: CPT

## 2018-12-05 PROCEDURE — 86901 BLOOD TYPING SEROLOGIC RH(D): CPT

## 2018-12-05 PROCEDURE — 80048 BASIC METABOLIC PNL TOTAL CA: CPT

## 2018-12-05 PROCEDURE — 86850 RBC ANTIBODY SCREEN: CPT

## 2018-12-05 PROCEDURE — 85025 COMPLETE CBC W/AUTO DIFF WBC: CPT

## 2018-12-05 PROCEDURE — 86900 BLOOD TYPING SEROLOGIC ABO: CPT

## 2018-12-08 ENCOUNTER — ANESTHESIA EVENT (OUTPATIENT)
Dept: SURGERY | Facility: HOSPITAL | Age: 53
End: 2018-12-08

## 2018-12-09 NOTE — H&P
[facility]    PATIENT'S NAME: Jose L Parisi Dr   ATTENDING PHYSICIAN: Leola Lainez M.D.    PATIENT ACCOUNT#:   [de-identified]    LOCATION:    MEDICAL RECORD #:   [id number]       YOB: 1965  ADMISSION DATE:       12/21/2018    HISTORY AND abscess. MEDICATIONS:  Sertraline, Xanax, albuterol inhaler, lisinopril/hydrochlorothiazide, Prilosec. ALLERGIES:  Topical iodine (rash), Dilaudid (itching, nausea, jittery). FAMILY HISTORY:  Negative.     SOCIAL HISTORY:  The patient lives in [place]

## 2018-12-21 ENCOUNTER — ANESTHESIA (OUTPATIENT)
Dept: SURGERY | Facility: HOSPITAL | Age: 53
End: 2018-12-21

## 2018-12-21 ENCOUNTER — HOSPITAL ENCOUNTER (INPATIENT)
Facility: HOSPITAL | Age: 53
LOS: 2 days | Discharge: HOME HEALTH | DRG: 470 | End: 2018-12-23
Attending: ORTHOPAEDIC SURGERY | Admitting: ORTHOPAEDIC SURGERY
Payer: COMMERCIAL

## 2018-12-21 ENCOUNTER — APPOINTMENT (OUTPATIENT)
Dept: GENERAL RADIOLOGY | Facility: HOSPITAL | Age: 53
DRG: 470 | End: 2018-12-21
Attending: ORTHOPAEDIC SURGERY
Payer: COMMERCIAL

## 2018-12-21 DIAGNOSIS — M16.11 PRIMARY OSTEOARTHRITIS OF RIGHT HIP: Primary | ICD-10-CM

## 2018-12-21 PROBLEM — K21.9 GASTROESOPHAGEAL REFLUX DISEASE: Chronic | Status: ACTIVE | Noted: 2018-04-27

## 2018-12-21 PROCEDURE — 3E0T3BZ INTRODUCTION OF ANESTHETIC AGENT INTO PERIPHERAL NERVES AND PLEXI, PERCUTANEOUS APPROACH: ICD-10-PCS | Performed by: ANESTHESIOLOGY

## 2018-12-21 PROCEDURE — 73501 X-RAY EXAM HIP UNI 1 VIEW: CPT | Performed by: ORTHOPAEDIC SURGERY

## 2018-12-21 PROCEDURE — 0SR903A REPLACEMENT OF RIGHT HIP JOINT WITH CERAMIC SYNTHETIC SUBSTITUTE, UNCEMENTED, OPEN APPROACH: ICD-10-PCS | Performed by: ORTHOPAEDIC SURGERY

## 2018-12-21 PROCEDURE — 99252 IP/OBS CONSLTJ NEW/EST SF 35: CPT | Performed by: HOSPITALIST

## 2018-12-21 DEVICE — IMPLANTABLE DEVICE: Type: IMPLANTABLE DEVICE | Site: HIP | Status: FUNCTIONAL

## 2018-12-21 DEVICE — TM MOD CUP 58MM CLUSTER: Type: IMPLANTABLE DEVICE | Site: HIP | Status: FUNCTIONAL

## 2018-12-21 DEVICE — BIOLOX® DELTA, CERAMIC FEMORAL HEAD, S, Ø 36/-3.5, TAPER 12/14
Type: IMPLANTABLE DEVICE | Site: HIP | Status: FUNCTIONAL
Brand: BIOLOX® DELTA

## 2018-12-21 DEVICE — BONE SCREW 6.5X40 SELF-TAP: Type: IMPLANTABLE DEVICE | Site: HIP | Status: FUNCTIONAL

## 2018-12-21 RX ORDER — ONDANSETRON 2 MG/ML
4 INJECTION INTRAMUSCULAR; INTRAVENOUS EVERY 4 HOURS PRN
Status: ACTIVE | OUTPATIENT
Start: 2018-12-21 | End: 2018-12-23

## 2018-12-21 RX ORDER — DOCUSATE SODIUM 100 MG/1
100 CAPSULE, LIQUID FILLED ORAL 2 TIMES DAILY
Status: DISCONTINUED | OUTPATIENT
Start: 2018-12-21 | End: 2018-12-23

## 2018-12-21 RX ORDER — ACETAMINOPHEN 325 MG/1
650 TABLET ORAL ONCE
Status: COMPLETED | OUTPATIENT
Start: 2018-12-21 | End: 2018-12-21

## 2018-12-21 RX ORDER — PANTOPRAZOLE SODIUM 40 MG/1
40 TABLET, DELAYED RELEASE ORAL
Status: DISCONTINUED | OUTPATIENT
Start: 2018-12-22 | End: 2018-12-23

## 2018-12-21 RX ORDER — DIPHENHYDRAMINE HYDROCHLORIDE 50 MG/ML
25 INJECTION INTRAMUSCULAR; INTRAVENOUS ONCE AS NEEDED
Status: ACTIVE | OUTPATIENT
Start: 2018-12-21 | End: 2018-12-21

## 2018-12-21 RX ORDER — HYDROMORPHONE HYDROCHLORIDE 1 MG/ML
0.2 INJECTION, SOLUTION INTRAMUSCULAR; INTRAVENOUS; SUBCUTANEOUS EVERY 2 HOUR PRN
Status: ACTIVE | OUTPATIENT
Start: 2018-12-21 | End: 2018-12-23

## 2018-12-21 RX ORDER — ACETAMINOPHEN 325 MG/1
650 TABLET ORAL 4 TIMES DAILY
Status: DISPENSED | OUTPATIENT
Start: 2018-12-21 | End: 2018-12-23

## 2018-12-21 RX ORDER — BISACODYL 10 MG
10 SUPPOSITORY, RECTAL RECTAL
Status: DISCONTINUED | OUTPATIENT
Start: 2018-12-21 | End: 2018-12-23

## 2018-12-21 RX ORDER — SODIUM PHOSPHATE, DIBASIC AND SODIUM PHOSPHATE, MONOBASIC 7; 19 G/133ML; G/133ML
1 ENEMA RECTAL ONCE AS NEEDED
Status: DISCONTINUED | OUTPATIENT
Start: 2018-12-21 | End: 2018-12-23

## 2018-12-21 RX ORDER — OXYCODONE HYDROCHLORIDE 10 MG/1
10 TABLET ORAL EVERY 4 HOURS PRN
Status: DISPENSED | OUTPATIENT
Start: 2018-12-21 | End: 2018-12-23

## 2018-12-21 RX ORDER — SODIUM CHLORIDE, SODIUM LACTATE, POTASSIUM CHLORIDE, CALCIUM CHLORIDE 600; 310; 30; 20 MG/100ML; MG/100ML; MG/100ML; MG/100ML
INJECTION, SOLUTION INTRAVENOUS CONTINUOUS
Status: DISCONTINUED | OUTPATIENT
Start: 2018-12-21 | End: 2018-12-21 | Stop reason: HOSPADM

## 2018-12-21 RX ORDER — DIPHENHYDRAMINE HCL 25 MG
25 CAPSULE ORAL EVERY 4 HOURS PRN
Status: DISCONTINUED | OUTPATIENT
Start: 2018-12-21 | End: 2018-12-23

## 2018-12-21 RX ORDER — CEFAZOLIN SODIUM/WATER 2 G/20 ML
2 SYRINGE (ML) INTRAVENOUS ONCE
Status: COMPLETED | OUTPATIENT
Start: 2018-12-21 | End: 2018-12-21

## 2018-12-21 RX ORDER — SODIUM CHLORIDE, SODIUM LACTATE, POTASSIUM CHLORIDE, CALCIUM CHLORIDE 600; 310; 30; 20 MG/100ML; MG/100ML; MG/100ML; MG/100ML
INJECTION, SOLUTION INTRAVENOUS CONTINUOUS
Status: DISCONTINUED | OUTPATIENT
Start: 2018-12-21 | End: 2018-12-22

## 2018-12-21 RX ORDER — HYDROMORPHONE HYDROCHLORIDE 1 MG/ML
0.4 INJECTION, SOLUTION INTRAMUSCULAR; INTRAVENOUS; SUBCUTANEOUS EVERY 2 HOUR PRN
Status: DISPENSED | OUTPATIENT
Start: 2018-12-21 | End: 2018-12-23

## 2018-12-21 RX ORDER — ACETAMINOPHEN 325 MG/1
TABLET ORAL
Status: COMPLETED
Start: 2018-12-21 | End: 2018-12-21

## 2018-12-21 RX ORDER — SERTRALINE HYDROCHLORIDE 100 MG/1
100 TABLET, FILM COATED ORAL DAILY
Status: DISCONTINUED | OUTPATIENT
Start: 2018-12-21 | End: 2018-12-23

## 2018-12-21 RX ORDER — KETOROLAC TROMETHAMINE 30 MG/ML
30 INJECTION, SOLUTION INTRAMUSCULAR; INTRAVENOUS EVERY 6 HOURS
Status: COMPLETED | OUTPATIENT
Start: 2018-12-21 | End: 2018-12-22

## 2018-12-21 RX ORDER — ONDANSETRON 2 MG/ML
4 INJECTION INTRAMUSCULAR; INTRAVENOUS AS NEEDED
Status: DISCONTINUED | OUTPATIENT
Start: 2018-12-21 | End: 2018-12-21 | Stop reason: HOSPADM

## 2018-12-21 RX ORDER — METOCLOPRAMIDE HYDROCHLORIDE 5 MG/ML
10 INJECTION INTRAMUSCULAR; INTRAVENOUS EVERY 6 HOURS PRN
Status: ACTIVE | OUTPATIENT
Start: 2018-12-21 | End: 2018-12-23

## 2018-12-21 RX ORDER — TRAMADOL HYDROCHLORIDE 50 MG/1
50 TABLET ORAL EVERY 6 HOURS
Status: COMPLETED | OUTPATIENT
Start: 2018-12-21 | End: 2018-12-23

## 2018-12-21 RX ORDER — SCOLOPAMINE TRANSDERMAL SYSTEM 1 MG/1
1 PATCH, EXTENDED RELEASE TRANSDERMAL ONCE
Status: DISCONTINUED | OUTPATIENT
Start: 2018-12-21 | End: 2018-12-22

## 2018-12-21 RX ORDER — ACETAMINOPHEN 500 MG
1000 TABLET ORAL ONCE
Status: DISCONTINUED | OUTPATIENT
Start: 2018-12-21 | End: 2018-12-21 | Stop reason: HOSPADM

## 2018-12-21 RX ORDER — HYDROMORPHONE HYDROCHLORIDE 1 MG/ML
0.8 INJECTION, SOLUTION INTRAMUSCULAR; INTRAVENOUS; SUBCUTANEOUS EVERY 2 HOUR PRN
Status: DISPENSED | OUTPATIENT
Start: 2018-12-21 | End: 2018-12-23

## 2018-12-21 RX ORDER — ALPRAZOLAM 1 MG/1
1 TABLET ORAL EVERY 8 HOURS PRN
Status: DISCONTINUED | OUTPATIENT
Start: 2018-12-21 | End: 2018-12-23

## 2018-12-21 RX ORDER — TIZANIDINE 4 MG/1
4 TABLET ORAL 3 TIMES DAILY PRN
Status: DISCONTINUED | OUTPATIENT
Start: 2018-12-21 | End: 2018-12-22

## 2018-12-21 RX ORDER — OXYCODONE HYDROCHLORIDE 15 MG/1
15 TABLET ORAL EVERY 4 HOURS PRN
Status: ACTIVE | OUTPATIENT
Start: 2018-12-21 | End: 2018-12-23

## 2018-12-21 RX ORDER — HYDROCODONE BITARTRATE AND ACETAMINOPHEN 10; 325 MG/1; MG/1
TABLET ORAL
Qty: 50 TABLET | Refills: 0 | Status: SHIPPED | OUTPATIENT
Start: 2018-12-21 | End: 2019-12-06

## 2018-12-21 RX ORDER — DIPHENHYDRAMINE HYDROCHLORIDE 50 MG/ML
12.5 INJECTION INTRAMUSCULAR; INTRAVENOUS EVERY 4 HOURS PRN
Status: DISCONTINUED | OUTPATIENT
Start: 2018-12-21 | End: 2018-12-23

## 2018-12-21 RX ORDER — POLYETHYLENE GLYCOL 3350 17 G/17G
17 POWDER, FOR SOLUTION ORAL DAILY PRN
Status: DISCONTINUED | OUTPATIENT
Start: 2018-12-21 | End: 2018-12-23

## 2018-12-21 RX ORDER — CEFAZOLIN SODIUM/WATER 2 G/20 ML
2 SYRINGE (ML) INTRAVENOUS EVERY 8 HOURS
Status: COMPLETED | OUTPATIENT
Start: 2018-12-21 | End: 2018-12-22

## 2018-12-21 RX ORDER — OXYCODONE HYDROCHLORIDE 5 MG/1
5 TABLET ORAL EVERY 4 HOURS PRN
Status: DISPENSED | OUTPATIENT
Start: 2018-12-21 | End: 2018-12-23

## 2018-12-21 RX ORDER — SODIUM CHLORIDE, SODIUM LACTATE, POTASSIUM CHLORIDE, CALCIUM CHLORIDE 600; 310; 30; 20 MG/100ML; MG/100ML; MG/100ML; MG/100ML
INJECTION, SOLUTION INTRAVENOUS CONTINUOUS
Status: DISCONTINUED | OUTPATIENT
Start: 2018-12-21 | End: 2018-12-23

## 2018-12-21 RX ORDER — SENNOSIDES 8.6 MG
17.2 TABLET ORAL NIGHTLY
Status: DISCONTINUED | OUTPATIENT
Start: 2018-12-21 | End: 2018-12-23

## 2018-12-21 RX ORDER — NALOXONE HYDROCHLORIDE 0.4 MG/ML
80 INJECTION, SOLUTION INTRAMUSCULAR; INTRAVENOUS; SUBCUTANEOUS AS NEEDED
Status: DISCONTINUED | OUTPATIENT
Start: 2018-12-21 | End: 2018-12-21 | Stop reason: HOSPADM

## 2018-12-21 RX ORDER — HYDROMORPHONE HYDROCHLORIDE 1 MG/ML
0.4 INJECTION, SOLUTION INTRAMUSCULAR; INTRAVENOUS; SUBCUTANEOUS EVERY 5 MIN PRN
Status: DISCONTINUED | OUTPATIENT
Start: 2018-12-21 | End: 2018-12-21 | Stop reason: HOSPADM

## 2018-12-21 NOTE — PHYSICAL THERAPY NOTE
PHYSICAL THERAPY HIP EVALUATION - INPATIENT     Room Number: 355/355-A  Evaluation Date: 12/21/2018  Type of Evaluation: Initial  Physician Order: PT Eval and Treat    Presenting Problem: R NEWTON   Reason for Therapy: Mobility Dysfunction and Discharge Plann level  Stairs to Enter : 3     Stairs to [employer]: 15  Railing: Yes    Lives With: Family  Drives: Yes  Patient Owned Equipment: Rolling walker;Cane       Prior Level of [place]: Ind/mod I in his mobilities s any use of an AD    SUBJECTIVE  Pain on the walker  Pattern: R Decreased stance time  Stoop/Curb Assistance: Not tested       Skilled Therapy Provided: In bed and educated on R hip precaution for posterior approach. Expressed understanding but requires reinforcement.  Instructed with importance of mo motion;Strengthening;Stair training;Transfer training;Balance training  Rehab Potential : Good  Frequency (Obs): BID  Number of Visits to Meet Established Goals: 5      CURRENT GOALS  Goal #1  Patient is able to demonstrate supine - sit EOB @ level: superv

## 2018-12-21 NOTE — ANESTHESIA PREPROCEDURE EVALUATION
PRE-OP EVALUATION    Patient Name: Abi Pretty    Pre-op Diagnosis: Primary osteoarthritis of right hip [M16.11]    Procedure(s):  RIGHT TOTAL HIP ARTHROPLASTY    Surgeon(s) and Role:     Loree Fam MD - Primary    Pre-op vitals reviewed. damage reconstructed   • OTHER SURGICAL HISTORY      abdominal surgery/ removal of abscess   • TOTAL KNEE REPLACEMENT      left-2013     Social History    Tobacco Use      Smoking status: Former Smoker        Packs/day: 2.00        Years: 25.00        Pack

## 2018-12-21 NOTE — CONSULTS
[facility]  [address] [place] [address] [place] Patient Status:  Inpatient    1965 MRN [id number]   [facility] 3SW-A Attending Jono [place] MD   Hosp Day # 0 PCP Susan Sullivan     Reason for consult: Medical management    Req (Topical)        RASH  Dilaudid [[employer]*    ITCHING, NAUSEA ONLY, JITTERY    Medications:    No current facility-administered medications on file prior to encounter.    Current Outpatient Medications on File Prior to Encounter:  Acetaminophen (ACETAMINOP input(s): WBC, HGB, MCV, PLT, BAND, INR in the last 168 hours. Invalid input(s): LYM#, MONO#, BASOS#, EOSIN#    No results for input(s): GLU, BUN, CREATSERUM, GFRAA, GFRNAA, CA, ALB, NA, K, CL, CO2, ALKPHO, AST, ALT, BILT, TP in the last 168 hours.     [place]

## 2018-12-21 NOTE — INTERVAL H&P NOTE
Pre-op Diagnosis: Primary osteoarthritis of right hip [M16.11]    The above referenced H&P was reviewed by SUKH Rocha on 12/21/2018, the patient was examined and no significant changes have occurred in the patient's condition since the H&P was per

## 2018-12-21 NOTE — ANESTHESIA POSTPROCEDURE EVALUATION
[address] Patient Status:  Surgery Admit   Age/Gender 52year old male MRN [id number]   Location [facility] Attending Lavonne Good MD   Hosp Day # 0 PCP KARSTEN ABEBE       Anesthesia Post-op N

## 2018-12-21 NOTE — BRIEF OP NOTE
Pre-Operative Diagnosis: Primary osteoarthritis of right hip [M16.11]     Post-Operative Diagnosis: Primary osteoarthritis of right hip [M16.11]      Procedure Performed:   Procedure(s):  RIGHT TOTAL HIP ARTHROPLASTY    Surgeon(s) and Role:     * Janell Castellano

## 2018-12-22 PROBLEM — Z99.89 OSA ON CPAP: Status: ACTIVE | Noted: 2018-12-22

## 2018-12-22 PROBLEM — G47.33 OSA ON CPAP: Status: ACTIVE | Noted: 2018-12-22

## 2018-12-22 PROCEDURE — 99232 SBSQ HOSP IP/OBS MODERATE 35: CPT | Performed by: HOSPITALIST

## 2018-12-22 RX ORDER — HYDROCODONE BITARTRATE AND ACETAMINOPHEN 10; 325 MG/1; MG/1
1 TABLET ORAL EVERY 4 HOURS PRN
Status: DISCONTINUED | OUTPATIENT
Start: 2018-12-23 | End: 2018-12-23

## 2018-12-22 RX ORDER — TIZANIDINE 2 MG/1
2 TABLET ORAL 3 TIMES DAILY PRN
Status: DISCONTINUED | OUTPATIENT
Start: 2018-12-22 | End: 2018-12-23

## 2018-12-22 RX ORDER — HYDROCODONE BITARTRATE AND ACETAMINOPHEN 10; 325 MG/1; MG/1
2 TABLET ORAL EVERY 4 HOURS PRN
Status: DISCONTINUED | OUTPATIENT
Start: 2018-12-23 | End: 2018-12-23

## 2018-12-22 NOTE — CM/SW NOTE
SW spoke to pt and wife. Pt identified that he would prefer to discharge with [facility] through [facility]. SW completed an [facility] referral to [address] [place] [address]. [facility] ph: [phone].     Social work to remain available for support or any discharge plan

## 2018-12-22 NOTE — PROGRESS NOTES
[address] Patient Status:  Inpatient    1965 MRN [id number]   [facility] 3SW-A Attending Jono [place] MD   Hosp Day # 1 PCP KARSTEN ABEBE     Subjective:  RightTotal Hip Arthroplasty POD #1  Systemic or

## 2018-12-22 NOTE — RESPIRATORY THERAPY NOTE
ZEB : EQUIPMENT USE: DAILY SUMMARY                                            SET MODE: AUTO CPAP WITH CFLEX                                          USAGE IN HOURS:6:48                                          90%

## 2018-12-22 NOTE — PHYSICAL THERAPY NOTE
PHYSICAL THERAPY TREATMENT NOTE - INPATIENT    Room Number: 355/355-A     Session: 2 & 3  Number of Visits to Meet Established Goals: 5    Presenting Problem: R NEWTON    Problem List  Active Problems:    Gastroesophageal reflux disease    Primary osteoarthr Normal  Dynamic Sitting: Normal           Static Standing: Fair -  Dynamic Standing: Whitney 243 '6-Clicks' INPATIENT SHORT FORM - BASIC MOBILITY  How much difficulty does the patient currently have. ..  -   Nely Singh recited 3/3 hip precautions and demonstrated independently throughout session. Pt ambulated 300 ft with RW and progressed to SBA, no longer demonstrating compensatory trunk lean and demonstrating increased RLE stance time and weight bearing.  Pt ascended/de PT     PLAN  PT Treatment Plan: Bed mobility; Body mechanics; Endurance; Energy conservation;Patient education; Family education; Neuromuscular re-educate;Gait training;Strengthening;Range of motion;Stair training;Transfer training;Balance training  Rehab Daisha

## 2018-12-22 NOTE — PROGRESS NOTES
[facility]  Progress Note     Darci Morning Patient Status:  Inpatient    1965 MRN [id number]   [facility] 3SW-A Attending Edwin Graves MD   Hosp Day # 1 PCP Tommie Mascorro     Chief Complaint: medical managemetn    S: 3. GERD  1. PPI  4. Essential HTN  1. Hold meds for now  5. Depression  1. SSRI  6. Anxiety  1. Continue PRN Xanax  7. Obesity  1.  BMI 39    Quality:  · DVT Prophylaxis: Eliquis  · CODE status: full  · Watson: no  · Central line: no    Estimated date of d

## 2018-12-22 NOTE — OCCUPATIONAL THERAPY NOTE
OCCUPATIONAL THERAPY QUICK EVALUATION - INPATIENT    Room Number: 355/355-A  Evaluation Date: 12/22/2018     Type of Evaluation: Initial  Presenting Problem: R NEWTON    Physician Order: IP Consult to Occupational Therapy  Reason for Therapy:  ADL/IADL Dysfun at [facility] [address] Right  Drives: Yes       Prior Level of Function: Pt typically independent with ADLs and mobility. Pt does not use AD. SUBJECTIVE   Pt stated, \"I am doing well I just need to slow down. \"    Patient self-stated goal is t bed with mod I with use of belt to assist with right leg while following precautions. Patient End of Session: Up in chair;Needs met;Call light within reach;RN aware of session/findings; All patient questions and concerns addressed;SCDs in place    ASSESS ADLS: safely and independently

## 2018-12-23 VITALS
DIASTOLIC BLOOD PRESSURE: 83 MMHG | SYSTOLIC BLOOD PRESSURE: 143 MMHG | BODY MASS INDEX: 39.79 KG/M2 | RESPIRATION RATE: 20 BRPM | TEMPERATURE: 99 F | HEIGHT: 67 IN | HEART RATE: 93 BPM | OXYGEN SATURATION: 95 % | WEIGHT: 253.5 LBS

## 2018-12-23 RX ORDER — PSEUDOEPHEDRINE HCL 30 MG
100 TABLET ORAL 2 TIMES DAILY
Qty: 30 CAPSULE | Refills: 0 | Status: SHIPPED | OUTPATIENT
Start: 2018-12-23 | End: 2019-12-06

## 2018-12-23 NOTE — CM/SW NOTE
MARTINA spoke with Mey Perry at [employer] at [facility]. Mey Perry states they are able to accept referral, however pt will be seen starting 12-26. MARTINA updated pt. MARTINA also checked with [facility], however they are unable to service pt as it is not in their service area.      Dianelys

## 2018-12-23 NOTE — OPERATIVE REPORT
[facility]    PATIENT'S NAME: Is[address]   ATTENDING PHYSICIAN: Britta Quispe M.D. OPERATING PHYSICIAN: Britta Quispe M.D.    PATIENT ACCOUNT#:   [de-identified]    LOCATION:  [address] [place] [address]  MEDICAL RECORD #:   [id number]       DATE OF BIRTH: fascia of the gluteus rosanne, then the fibers of the gluteus rosanne were gently teased apart with a gloved digit. A self-retaining Charnley retractor was positioned with a large blade posteriorly, a small blade anteriorly.   The hip was placed in marked relatively osteoporotic. I was able to broach all the way up to a size 16.25 broach before I obtained rotational stability. I used a calcar planer to plane the calcar. I withdrew the broach. I turned my attention toward the acetabulum.   I placed an ant onto the trunnion of the femoral component. I reduced the hip. I had excellent stability with full flexion and external rotation as well as full flexion, adduction, and internal rotation. Limb length was restored; offset was stable.  I copiously irrigate

## 2018-12-23 NOTE — PHYSICAL THERAPY NOTE
PHYSICAL THERAPY HIP TREATMENT NOTE - INPATIENT      Room Number: 355/355-A     Session: 4  Number of Visits to Meet Established Goals: 5    Presenting Problem: R NEWTON    Problem List  Active Problems:    Gastroesophageal reflux disease    Primary osteoarth O2 WALK                  AM-PAC '6-Clicks' INPATIENT SHORT FORM - BASIC MOBILITY  How much difficulty does the patient currently have. ..  -   Turning over in bed (including adjusting bedclothes, sheets and blankets)?: None   -   Sitting fernando participated in group session, tolerance was good.  was present no   is a n/a    Patient End of Session: Up in chair;Needs met;Call light within reach;RN aware of session/findings; All patient questions and concerns addressed;SCDs in place; Ice ap

## 2018-12-23 NOTE — DISCHARGE SUMMARY
Discharge Summary  Patient ID:  Elham Aguilar  [id number]  52year old  8/27/1965    Admit date: 12/21/2018    Discharge date and time: 12/23/18    Attending Physician: No att. providers found     Reason for admission: Primary osteoarthritis of right hi

## 2018-12-23 NOTE — PROGRESS NOTES
Acute Pain Service    Post Op Day 2 Ortho Note    Assessed patient in chair. Patient rates pain 3-4/10 at rest and 5-6/10 with activity. Patient states Irvin Dasilva is working well to manage pain; denies itching/nausea/dizziness.     Patient able to bear weight on

## 2018-12-23 NOTE — RESPIRATORY THERAPY NOTE
ZEB : EQUIPMENT USE: DAILY SUMMARY                                            SET MODE: AUTO CPAP WITH CFLEX                                          USAGE IN HOURS:7:38                                          90%

## 2018-12-23 NOTE — PROGRESS NOTES
[address] Patient Status:  Inpatient    1965 MRN [id number]   [facility] 3SW-A Attending Benny Rousseau MD   Hosp Day # 2 PCP KARSTEN ABEBE     Subjective:  RightTotal Hip Arthroplasty POD #2  Systemic or

## 2018-12-26 NOTE — PAYOR COMM NOTE
--------------  ADMISSION REVIEW     Payor: [address]  Subscriber #:  [id number]  Authorization Number: [id number]    Admit date: 12/21/18  Admit time: 65       Admitting Physician: Diane Pedersen MD  Attending Physician:  No att. provider incision, risks of anesthesia, wound infection, DVT, hip joint infection, dislocation, limb length discrepancy. Despite these and other risks, the patient does wish to proceed. He has no medical contraindication to the proposed surgery.     PAST MEDICAL H Annamarie Kayser, MD on 12/9/2018 11:34 AM         MEDICATIONS ADMINISTERED IN LAST 1 DAY:  Ancef iv q 8  toradol iv q 6  lr 100 and 20  Dilaudid iv x 3    PREOPERATIVE DIAGNOSIS:  Severe osteoarthritis, right hip, status post right total knee replacement.

## 2019-01-03 NOTE — PAYOR COMM NOTE
--------------  DISCHARGE REVIEW    [facility]  Subscriber #:  [id number]  Authorization Number: [id number]    Admit date: 12/21/18  Admit time:  9588  Discharge Date: 12/23/2018  1:42 PM       Discharge Diagnoses: Primary osteoarthriti

## 2019-07-25 ENCOUNTER — APPOINTMENT (OUTPATIENT)
Dept: GENERAL RADIOLOGY | Facility: HOSPITAL | Age: 54
End: 2019-07-25
Attending: NURSE PRACTITIONER
Payer: COMMERCIAL

## 2019-07-25 ENCOUNTER — HOSPITAL ENCOUNTER (EMERGENCY)
Facility: HOSPITAL | Age: 54
Discharge: HOME | End: 2019-07-25
Attending: EMERGENCY MEDICINE
Payer: COMMERCIAL

## 2019-07-25 VITALS
RESPIRATION RATE: 16 BRPM | DIASTOLIC BLOOD PRESSURE: 68 MMHG | OXYGEN SATURATION: 93 % | TEMPERATURE: 98 F | SYSTOLIC BLOOD PRESSURE: 108 MMHG | HEART RATE: 78 BPM

## 2019-07-25 DIAGNOSIS — S86.911A STRAIN OF RIGHT KNEE AND LEG, INITIAL ENCOUNTER: Primary | ICD-10-CM

## 2019-07-25 PROCEDURE — 94640 AIRWAY INHALATION TREATMENT: CPT

## 2019-07-25 PROCEDURE — 73502 X-RAY EXAM HIP UNI 2-3 VIEWS: CPT | Performed by: NURSE PRACTITIONER

## 2019-07-25 PROCEDURE — 73562 X-RAY EXAM OF KNEE 3: CPT | Performed by: NURSE PRACTITIONER

## 2019-07-25 PROCEDURE — 99284 EMERGENCY DEPT VISIT MOD MDM: CPT

## 2019-07-25 RX ORDER — IPRATROPIUM BROMIDE AND ALBUTEROL SULFATE 2.5; .5 MG/3ML; MG/3ML
3 SOLUTION RESPIRATORY (INHALATION) ONCE
Status: COMPLETED | OUTPATIENT
Start: 2019-07-25 | End: 2019-07-25

## 2019-07-25 RX ORDER — CYCLOBENZAPRINE HCL 10 MG
10 TABLET ORAL 3 TIMES DAILY PRN
Qty: 20 TABLET | Refills: 0 | Status: SHIPPED | OUTPATIENT
Start: 2019-07-25 | End: 2019-08-01

## 2019-07-25 RX ORDER — HYDROCODONE BITARTRATE AND ACETAMINOPHEN 5; 325 MG/1; MG/1
1 TABLET ORAL EVERY 6 HOURS PRN
Qty: 10 TABLET | Refills: 0 | Status: SHIPPED | OUTPATIENT
Start: 2019-07-25 | End: 2019-08-01
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
## 2019-09-24 ENCOUNTER — OFFICE VISIT (OUTPATIENT)
Dept: FAMILY MEDICINE CLINIC | Facility: CLINIC | Age: 54
End: 2019-09-24
Payer: COMMERCIAL

## 2019-09-24 VITALS
RESPIRATION RATE: 18 BRPM | OXYGEN SATURATION: 99 % | HEART RATE: 91 BPM | BODY MASS INDEX: 40.81 KG/M2 | TEMPERATURE: 98 F | WEIGHT: 260 LBS | HEIGHT: 67 IN | DIASTOLIC BLOOD PRESSURE: 78 MMHG | SYSTOLIC BLOOD PRESSURE: 128 MMHG

## 2019-09-24 DIAGNOSIS — F41.9 ANXIETY: ICD-10-CM

## 2019-09-24 DIAGNOSIS — K21.9 CHRONIC GERD: ICD-10-CM

## 2019-09-24 DIAGNOSIS — Z12.11 COLON CANCER SCREENING: ICD-10-CM

## 2019-09-24 DIAGNOSIS — Z00.00 LABORATORY EXAMINATION ORDERED AS PART OF A COMPLETE PHYSICAL EXAMINATION: Primary | ICD-10-CM

## 2019-09-24 PROCEDURE — 99204 OFFICE O/P NEW MOD 45 MIN: CPT | Performed by: FAMILY MEDICINE

## 2019-09-24 PROCEDURE — 90471 IMMUNIZATION ADMIN: CPT | Performed by: FAMILY MEDICINE

## 2019-09-24 PROCEDURE — 90686 IIV4 VACC NO PRSV 0.5 ML IM: CPT | Performed by: FAMILY MEDICINE

## 2019-09-24 RX ORDER — ALPRAZOLAM 0.5 MG/1
0.5 TABLET, ORALLY DISINTEGRATING ORAL 2 TIMES DAILY PRN
Qty: 24 TABLET | Refills: 0 | Status: SHIPPED | OUTPATIENT
Start: 2019-09-24 | End: 2019-12-04

## 2019-09-24 RX ORDER — ALPRAZOLAM 0.5 MG/1
0.5 TABLET, ORALLY DISINTEGRATING ORAL 2 TIMES DAILY PRN
Qty: 24 TABLET | Refills: 0 | Status: SHIPPED | OUTPATIENT
Start: 2019-09-24 | End: 2019-09-24

## 2019-09-24 NOTE — PROGRESS NOTES
HPI:    Patient ID: Lavell Norowod is a 62year old male. Pt has history of hypertension, GERD, anxiety, asthma. Pt use to smoke but quite 6-7 years ago.  Pt states he was told he had a problem with his thyroid and was prescribed medication which left h apixaban 5 MG Oral Tab Take 0.5 tablets (2.5 mg total) by mouth 2 (two) times daily. Disp: 28 tablet Rfl: 0   Sertraline HCl 100 MG Oral Tab Take 100 mg by mouth daily.  Disp:  Rfl:    ALPRAZolam 1 MG Oral Tab Take 1 mg by mouth every 8 (eight) hours as n Encounter      Lipid Panel      PSA (Screening)      CBC W Differential W Platelet      Comp Metabolic Panel (14)      TSH W Reflex To Free T4      Vitamin D, 25-Hydroxy [E]    Meds This Visit:  Requested Prescriptions     Signed Prescriptions Disp Refills

## 2019-09-30 ENCOUNTER — TELEPHONE (OUTPATIENT)
Dept: FAMILY MEDICINE CLINIC | Facility: CLINIC | Age: 54
End: 2019-09-30

## 2019-10-29 ENCOUNTER — PATIENT MESSAGE (OUTPATIENT)
Dept: FAMILY MEDICINE CLINIC | Facility: CLINIC | Age: 54
End: 2019-10-29

## 2019-10-30 RX ORDER — ALBUTEROL SULFATE 90 UG/1
AEROSOL, METERED RESPIRATORY (INHALATION)
Qty: 8.5 G | Refills: 0 | Status: SHIPPED | OUTPATIENT
Start: 2019-10-30 | End: 2021-04-03

## 2019-10-30 NOTE — TELEPHONE ENCOUNTER
From: Faby Hartmann  To: Davida Murray DO  Sent: 10/29/2019 8:11 PM CDT  Subject: Prescription Question    Dr. Cherylene League,    Can you please refill my prescription of Albuterol Hfa Inh 200 puffs 8.5gm. I am out.  Thank you

## 2019-12-03 DIAGNOSIS — F41.9 ANXIETY: ICD-10-CM

## 2019-12-04 RX ORDER — ALPRAZOLAM 0.5 MG/1
TABLET, ORALLY DISINTEGRATING ORAL
Qty: 24 TABLET | Refills: 0 | Status: SHIPPED | OUTPATIENT
Start: 2019-12-04 | End: 2019-12-27

## 2019-12-06 ENCOUNTER — OFFICE VISIT (OUTPATIENT)
Dept: FAMILY MEDICINE CLINIC | Facility: CLINIC | Age: 54
End: 2019-12-06
Payer: COMMERCIAL

## 2019-12-06 VITALS
TEMPERATURE: 98 F | OXYGEN SATURATION: 98 % | BODY MASS INDEX: 41.12 KG/M2 | DIASTOLIC BLOOD PRESSURE: 70 MMHG | WEIGHT: 262 LBS | HEIGHT: 67 IN | RESPIRATION RATE: 20 BRPM | SYSTOLIC BLOOD PRESSURE: 114 MMHG | HEART RATE: 84 BPM

## 2019-12-06 DIAGNOSIS — Z99.89 OSA ON CPAP: ICD-10-CM

## 2019-12-06 DIAGNOSIS — R53.83 DECREASED ENERGY: Primary | ICD-10-CM

## 2019-12-06 DIAGNOSIS — J43.8 OTHER EMPHYSEMA (HCC): Chronic | ICD-10-CM

## 2019-12-06 DIAGNOSIS — G47.33 OSA ON CPAP: ICD-10-CM

## 2019-12-06 PROCEDURE — 99214 OFFICE O/P EST MOD 30 MIN: CPT | Performed by: PHYSICIAN ASSISTANT

## 2019-12-06 NOTE — PROGRESS NOTES
HPI:    Patient ID: Faby Hartmann is a 62year old male. HPI   Patient presents today with c/o feeling decreased energy and fatigue. It is to the point where his boss has even noticed it.   He is 6 to 7 hours of sleep per night which is sometimes in THE TONGUE TWICE DAILY AS NEEDED FOR ANXIETY 24 tablet 0   • ALBUTEROL SULFATE  (90 Base) MCG/ACT Inhalation Aero Soln INHALE 1 PUFF BY MOUTH EVERY 4 HOURS 8.5 g 0   • Sertraline HCl 100 MG Oral Tab Take 100 mg by mouth daily.      • Lisinopril-Hydro trial of Spiriva as directed. Sample given. If patient tolerates well and has relief of symptoms may prescribe long term. 3. ZEB on CPAP  Patient has visual use of CPAP which works well.   He does not wake up feeling refreshed and has been advised to fo

## 2019-12-26 DIAGNOSIS — F41.9 ANXIETY: ICD-10-CM

## 2019-12-27 RX ORDER — ALPRAZOLAM 0.5 MG/1
TABLET, ORALLY DISINTEGRATING ORAL
Qty: 24 TABLET | Refills: 0 | Status: SHIPPED | OUTPATIENT
Start: 2019-12-27 | End: 2020-02-15

## 2020-02-15 DIAGNOSIS — F41.9 ANXIETY: ICD-10-CM

## 2020-02-15 RX ORDER — ALPRAZOLAM 0.5 MG/1
TABLET, ORALLY DISINTEGRATING ORAL
Qty: 24 TABLET | Refills: 0 | Status: SHIPPED | OUTPATIENT
Start: 2020-02-15 | End: 2020-03-15

## 2020-02-23 ENCOUNTER — PATIENT MESSAGE (OUTPATIENT)
Dept: FAMILY MEDICINE CLINIC | Facility: CLINIC | Age: 55
End: 2020-02-23

## 2020-02-23 DIAGNOSIS — M17.10 OSTEOARTHROSIS, LOCALIZED, PRIMARY, KNEE: ICD-10-CM

## 2020-02-23 DIAGNOSIS — M25.561 KNEE PAIN, BILATERAL: ICD-10-CM

## 2020-02-23 DIAGNOSIS — M25.562 KNEE PAIN, BILATERAL: ICD-10-CM

## 2020-02-24 RX ORDER — OMEPRAZOLE 40 MG/1
40 CAPSULE, DELAYED RELEASE ORAL DAILY
Qty: 90 CAPSULE | Refills: 0 | Status: SHIPPED | OUTPATIENT
Start: 2020-02-24 | End: 2020-03-31

## 2020-02-24 NOTE — TELEPHONE ENCOUNTER
From: Lan Alcantara  To: Dona Reed DO  Sent: 2/23/2020 5:10 PM CST  Subject: Prescription Nani Vail. I have been trying to refill a prescription of Omeprazole. Can you please refill this for me? I am completely out and I need it.

## 2020-03-03 ENCOUNTER — PATIENT MESSAGE (OUTPATIENT)
Dept: FAMILY MEDICINE CLINIC | Facility: CLINIC | Age: 55
End: 2020-03-03

## 2020-03-04 NOTE — TELEPHONE ENCOUNTER
From: Governor Vazquez  To: Michaela Lucero DO  Sent: 3/3/2020 4:46 PM CST  Subject: Prescription Question    I need a refill for my lisinopril/hctz 20/25. I tried doing it online but it wouldn’t allow me to. Thank you very much.
This was refilled today.
Adequate: hears normal conversation without difficulty

## 2020-03-15 DIAGNOSIS — F41.9 ANXIETY: ICD-10-CM

## 2020-03-16 RX ORDER — ALPRAZOLAM 0.5 MG/1
0.5 TABLET, ORALLY DISINTEGRATING ORAL 2 TIMES DAILY PRN
Qty: 24 TABLET | Refills: 0 | Status: SHIPPED | OUTPATIENT
Start: 2020-03-16 | End: 2020-03-31

## 2020-03-24 ENCOUNTER — TELEPHONE (OUTPATIENT)
Dept: FAMILY MEDICINE CLINIC | Facility: CLINIC | Age: 55
End: 2020-03-24

## 2020-03-24 DIAGNOSIS — N18.30 CKD (CHRONIC KIDNEY DISEASE), STAGE III (HCC): ICD-10-CM

## 2020-03-24 DIAGNOSIS — R73.09 ELEVATED GLUCOSE: ICD-10-CM

## 2020-03-24 DIAGNOSIS — I10 ESSENTIAL HYPERTENSION: Primary | ICD-10-CM

## 2020-03-24 DIAGNOSIS — E55.9 VITAMIN D DEFICIENCY: ICD-10-CM

## 2020-03-24 DIAGNOSIS — E78.5 HYPERLIPIDEMIA, UNSPECIFIED HYPERLIPIDEMIA TYPE: ICD-10-CM

## 2020-03-24 NOTE — TELEPHONE ENCOUNTER
Needs phone appt, will not be able to come in, would like to be called Friday @ 1:30 or later for phone appt. Is immuno compromised and also has children at home. Ok to leave  for wife comfirming time of phone appt.

## 2020-03-24 NOTE — TELEPHONE ENCOUNTER
Spoke to wife, they will fax results. Please be on look out for these result,  Pt would like to be called Friday for telephone visit. Along with his son, Ezequiel Hubbard.

## 2020-03-24 NOTE — TELEPHONE ENCOUNTER
Will you call pt on Friday at 1:30 for phone appointment to discuss lab results. Pt did have appt on 4/7/20    Can we confirm this time?

## 2020-03-27 ENCOUNTER — TELEPHONE (OUTPATIENT)
Dept: FAMILY MEDICINE CLINIC | Facility: CLINIC | Age: 55
End: 2020-03-27

## 2020-03-27 RX ORDER — AMLODIPINE BESYLATE 5 MG/1
5 TABLET ORAL DAILY
Qty: 90 TABLET | Refills: 1 | Status: SHIPPED | OUTPATIENT
Start: 2020-03-27 | End: 2021-09-01 | Stop reason: CLARIF

## 2020-03-27 RX ORDER — LISINOPRIL 20 MG/1
20 TABLET ORAL DAILY
Qty: 90 TABLET | Refills: 0 | Status: SHIPPED | OUTPATIENT
Start: 2020-03-27 | End: 2020-03-31

## 2020-03-27 NOTE — TELEPHONE ENCOUNTER
Virtual/Telephone Check-In    Ruben Arnold verbally consents to a Virtual/Telephone Check-In service on 03/27/20. Patient understands and accepts financial responsibility for any deductible, co-insurance and/or co-pays associated with this service.

## 2020-03-27 NOTE — TELEPHONE ENCOUNTER
Pt stated someone called his wife and left a message to her letting her know that we are trying to reach pt, pt stated he never received a call, pt is requesting to talk to the nurse or the doctor regarding his test results. Please call pt and advise.

## 2020-03-31 ENCOUNTER — PATIENT MESSAGE (OUTPATIENT)
Dept: FAMILY MEDICINE CLINIC | Facility: CLINIC | Age: 55
End: 2020-03-31

## 2020-03-31 ENCOUNTER — TELEPHONE (OUTPATIENT)
Dept: FAMILY MEDICINE CLINIC | Facility: CLINIC | Age: 55
End: 2020-03-31

## 2020-03-31 DIAGNOSIS — I10 ESSENTIAL HYPERTENSION: ICD-10-CM

## 2020-03-31 DIAGNOSIS — M25.561 KNEE PAIN, BILATERAL: ICD-10-CM

## 2020-03-31 DIAGNOSIS — M17.10 OSTEOARTHROSIS, LOCALIZED, PRIMARY, KNEE: ICD-10-CM

## 2020-03-31 DIAGNOSIS — M25.562 KNEE PAIN, BILATERAL: ICD-10-CM

## 2020-03-31 DIAGNOSIS — F41.9 ANXIETY: ICD-10-CM

## 2020-03-31 DIAGNOSIS — E55.9 VITAMIN D DEFICIENCY: ICD-10-CM

## 2020-03-31 RX ORDER — ERGOCALCIFEROL 1.25 MG/1
50000 CAPSULE ORAL WEEKLY
Qty: 20 CAPSULE | Refills: 0 | Status: SHIPPED | OUTPATIENT
Start: 2020-03-31 | End: 2020-10-26

## 2020-03-31 RX ORDER — OMEPRAZOLE 40 MG/1
40 CAPSULE, DELAYED RELEASE ORAL DAILY
Qty: 90 CAPSULE | Refills: 0 | Status: SHIPPED | OUTPATIENT
Start: 2020-03-31 | End: 2020-06-25

## 2020-03-31 RX ORDER — ALPRAZOLAM 0.5 MG/1
0.5 TABLET, ORALLY DISINTEGRATING ORAL 2 TIMES DAILY PRN
Qty: 24 TABLET | Refills: 0 | Status: SHIPPED | OUTPATIENT
Start: 2020-03-31 | End: 2020-04-02

## 2020-03-31 RX ORDER — LISINOPRIL 20 MG/1
20 TABLET ORAL DAILY
Qty: 90 TABLET | Refills: 0 | Status: SHIPPED | OUTPATIENT
Start: 2020-03-31 | End: 2020-09-21

## 2020-03-31 NOTE — TELEPHONE ENCOUNTER
From: Eric Guillen  To:  Mandie Donovan DO  Sent: 3/31/2020 8:31 AM CDT  Subject: Prescription Question    Can u send all my medications to [facility] [address] please with a 90 day supply Zoloft 100, omeprazole 40, and vitamin d and lisinopril  Can you se

## 2020-04-02 NOTE — TELEPHONE ENCOUNTER
Virtual/Telephone Check-In    Ruben Arnold verbally consents to a Virtual/Telephone Check-In service on 04/02/20. Patient understands and accepts financial responsibility for any deductible, co-insurance and/or co-pays associated with this service.

## 2020-04-12 ENCOUNTER — TELEPHONE (OUTPATIENT)
Dept: FAMILY MEDICINE CLINIC | Facility: CLINIC | Age: 55
End: 2020-04-12

## 2020-04-12 DIAGNOSIS — T24.231A PARTIAL THICKNESS BURN OF RIGHT LOWER LEG, INITIAL ENCOUNTER: Primary | ICD-10-CM

## 2020-04-12 RX ORDER — ACETAMINOPHEN AND CODEINE PHOSPHATE 300; 30 MG/1; MG/1
1 TABLET ORAL EVERY 6 HOURS PRN
Qty: 12 TABLET | Refills: 0 | Status: SHIPPED | OUTPATIENT
Start: 2020-04-12 | End: 2021-04-03 | Stop reason: ALTCHOICE
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
## 2020-04-13 ENCOUNTER — TELEMEDICINE (OUTPATIENT)
Dept: FAMILY MEDICINE CLINIC | Facility: CLINIC | Age: 55
End: 2020-04-13

## 2020-04-13 ENCOUNTER — E-VISIT (OUTPATIENT)
Dept: FAMILY MEDICINE CLINIC | Facility: CLINIC | Age: 55
End: 2020-04-13

## 2020-04-13 DIAGNOSIS — Z02.9 ADMINISTRATIVE ENCOUNTER: Primary | ICD-10-CM

## 2020-04-13 DIAGNOSIS — L08.9 WOUND INFECTION: ICD-10-CM

## 2020-04-13 DIAGNOSIS — T24.231D PARTIAL THICKNESS BURN OF RIGHT LOWER LEG, SUBSEQUENT ENCOUNTER: Primary | ICD-10-CM

## 2020-04-13 DIAGNOSIS — T14.8XXA WOUND INFECTION: ICD-10-CM

## 2020-04-13 PROCEDURE — 99213 OFFICE O/P EST LOW 20 MIN: CPT | Performed by: PHYSICIAN ASSISTANT

## 2020-04-13 RX ORDER — SULFAMETHOXAZOLE AND TRIMETHOPRIM 800; 160 MG/1; MG/1
1 TABLET ORAL 2 TIMES DAILY
Qty: 10 TABLET | Refills: 0 | Status: SHIPPED
Start: 2020-04-13 | End: 2020-04-18

## 2020-04-13 RX ORDER — SULFAMETHOXAZOLE AND TRIMETHOPRIM 800; 160 MG/1; MG/1
1 TABLET ORAL 2 TIMES DAILY
Qty: 10 TABLET | Refills: 0 | Status: SHIPPED | OUTPATIENT
Start: 2020-04-13 | End: 2020-04-18
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
## 2020-04-14 ENCOUNTER — TELEPHONE (OUTPATIENT)
Dept: FAMILY MEDICINE CLINIC | Facility: CLINIC | Age: 55
End: 2020-04-14
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
## 2020-04-20 ENCOUNTER — PATIENT MESSAGE (OUTPATIENT)
Dept: FAMILY MEDICINE CLINIC | Facility: CLINIC | Age: 55
End: 2020-04-20

## 2020-04-20 RX ORDER — GABAPENTIN 300 MG/1
300 CAPSULE ORAL 3 TIMES DAILY
Qty: 90 CAPSULE | Refills: 0 | Status: SHIPPED | OUTPATIENT
Start: 2020-04-20 | End: 2021-04-03 | Stop reason: ALTCHOICE

## 2020-04-20 NOTE — TELEPHONE ENCOUNTER
From: Therese Li  To: Maday Munguia  Sent: 4/20/2020 3:39 PM CDT  Subject: Message from provider    Ha Baird,   Message from [facility],  Gabapentin refilled. Ideally would like to have patient evaluated in the office to ensure good wound healing.

## 2020-04-24 ENCOUNTER — PATIENT MESSAGE (OUTPATIENT)
Dept: FAMILY MEDICINE CLINIC | Facility: CLINIC | Age: 55
End: 2020-04-24

## 2020-04-25 RX ORDER — CYCLOBENZAPRINE HCL 10 MG
10 TABLET ORAL 3 TIMES DAILY PRN
Qty: 15 TABLET | Refills: 0 | Status: SHIPPED | OUTPATIENT
Start: 2020-04-25 | End: 2020-10-26
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
## 2020-04-29 ENCOUNTER — TELEPHONE (OUTPATIENT)
Dept: FAMILY MEDICINE CLINIC | Facility: CLINIC | Age: 55
End: 2020-04-29
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
## 2020-05-20 DIAGNOSIS — F41.9 ANXIETY: ICD-10-CM

## 2020-05-20 DIAGNOSIS — F32.A DEPRESSION, UNSPECIFIED DEPRESSION TYPE: ICD-10-CM

## 2020-05-21 RX ORDER — ALPRAZOLAM 0.5 MG/1
0.5 TABLET, ORALLY DISINTEGRATING ORAL 2 TIMES DAILY PRN
Qty: 24 TABLET | Refills: 0 | Status: SHIPPED | OUTPATIENT
Start: 2020-05-21 | End: 2020-09-08

## 2020-06-19 DIAGNOSIS — E55.9 VITAMIN D DEFICIENCY: ICD-10-CM

## 2020-06-19 RX ORDER — ERGOCALCIFEROL 1.25 MG/1
CAPSULE ORAL
Qty: 12 CAPSULE | Refills: 1 | OUTPATIENT
Start: 2020-06-19

## 2020-06-25 DIAGNOSIS — M25.561 KNEE PAIN, BILATERAL: ICD-10-CM

## 2020-06-25 DIAGNOSIS — M17.10 OSTEOARTHROSIS, LOCALIZED, PRIMARY, KNEE: ICD-10-CM

## 2020-06-25 DIAGNOSIS — M25.562 KNEE PAIN, BILATERAL: ICD-10-CM

## 2020-06-25 RX ORDER — OMEPRAZOLE 40 MG/1
CAPSULE, DELAYED RELEASE ORAL
Qty: 90 CAPSULE | Refills: 0 | Status: SHIPPED | OUTPATIENT
Start: 2020-06-25 | End: 2020-09-21

## 2020-06-26 NOTE — PROGRESS NOTES
HPI:    Patient ID: London Fallon is a 62year old male. Diarrhea x 1 week. Couple times a day. No black or bloody stools. Lower abd pain mild middle to middle left. No F/C. Anxiety   This is a chronic problem.  The current episode started mor tablet 3   • OMEPRAZOLE 40 MG Oral Capsule Delayed Release TAKE 1 CAPSULE BY MOUTH EVERY DAY 90 capsule 0   • ALPRAZolam 0.5 MG Oral Tablet Dispersible Take 1 tablet (0.5 mg total) by mouth 2 (two) times daily as needed for Anxiety.  24 tablet 0   • cyclobe 1/2 day daily x 1 week, then 1 tab daily x 1 week, then 2 tab daily. Dispense: 60 tablet; Refill: 1  - Sertraline HCl 50 MG Oral Tab; 2 tab daily x 3 days, then 1 tab daily x1 week, then 1/2 tab daily x 1 week, then stop. Dispense: 60 tablet;  Refill: 0

## 2020-06-29 RX ORDER — TADALAFIL 10 MG/1
10 TABLET ORAL
Qty: 16 TABLET | Refills: 1 | Status: SHIPPED | OUTPATIENT
Start: 2020-06-29 | End: 2020-10-26

## 2020-07-07 DIAGNOSIS — F32.A ANXIETY AND DEPRESSION: ICD-10-CM

## 2020-07-07 DIAGNOSIS — F41.9 ANXIETY AND DEPRESSION: ICD-10-CM

## 2020-07-20 DIAGNOSIS — F41.9 ANXIETY AND DEPRESSION: ICD-10-CM

## 2020-07-20 DIAGNOSIS — F32.A ANXIETY AND DEPRESSION: ICD-10-CM

## 2020-07-21 RX ORDER — ESCITALOPRAM OXALATE 10 MG/1
TABLET ORAL
Qty: 60 TABLET | Refills: 1 | Status: SHIPPED | OUTPATIENT
Start: 2020-07-21 | End: 2020-09-09

## 2020-09-01 DIAGNOSIS — F41.9 ANXIETY: ICD-10-CM

## 2020-09-01 DIAGNOSIS — F32.A DEPRESSION, UNSPECIFIED DEPRESSION TYPE: ICD-10-CM

## 2020-09-08 RX ORDER — ALPRAZOLAM 0.5 MG/1
0.5 TABLET, ORALLY DISINTEGRATING ORAL 2 TIMES DAILY PRN
Qty: 24 TABLET | Refills: 1 | Status: SHIPPED | OUTPATIENT
Start: 2020-09-08 | End: 2020-10-29

## 2020-09-09 DIAGNOSIS — F41.9 ANXIETY AND DEPRESSION: ICD-10-CM

## 2020-09-09 DIAGNOSIS — F32.A ANXIETY AND DEPRESSION: ICD-10-CM

## 2020-09-10 RX ORDER — ESCITALOPRAM OXALATE 20 MG/1
20 TABLET ORAL DAILY
Qty: 90 TABLET | Refills: 1 | Status: SHIPPED | OUTPATIENT
Start: 2020-09-10 | End: 2021-03-01

## 2020-09-19 DIAGNOSIS — M17.10 OSTEOARTHROSIS, LOCALIZED, PRIMARY, KNEE: ICD-10-CM

## 2020-09-19 DIAGNOSIS — M25.561 KNEE PAIN, BILATERAL: ICD-10-CM

## 2020-09-19 DIAGNOSIS — I10 ESSENTIAL HYPERTENSION: ICD-10-CM

## 2020-09-19 DIAGNOSIS — M25.562 KNEE PAIN, BILATERAL: ICD-10-CM

## 2020-09-21 RX ORDER — LISINOPRIL 20 MG/1
TABLET ORAL
Qty: 90 TABLET | Refills: 0 | Status: SHIPPED | OUTPATIENT
Start: 2020-09-21 | End: 2020-12-21

## 2020-09-21 RX ORDER — OMEPRAZOLE 40 MG/1
CAPSULE, DELAYED RELEASE ORAL
Qty: 90 CAPSULE | Refills: 0 | Status: SHIPPED | OUTPATIENT
Start: 2020-09-21 | End: 2020-12-21

## 2020-10-26 ENCOUNTER — HOSPITAL ENCOUNTER (EMERGENCY)
Facility: HOSPITAL | Age: 55
Discharge: HOME | End: 2020-10-26
Attending: EMERGENCY MEDICINE
Payer: COMMERCIAL

## 2020-10-26 VITALS
RESPIRATION RATE: 20 BRPM | WEIGHT: 265 LBS | TEMPERATURE: 98 F | HEART RATE: 98 BPM | HEIGHT: 67 IN | SYSTOLIC BLOOD PRESSURE: 127 MMHG | BODY MASS INDEX: 41.59 KG/M2 | DIASTOLIC BLOOD PRESSURE: 74 MMHG | OXYGEN SATURATION: 96 %

## 2020-10-26 DIAGNOSIS — Z20.822 CLOSE EXPOSURE TO COVID-19 VIRUS: Primary | ICD-10-CM

## 2020-10-26 PROCEDURE — 99283 EMERGENCY DEPT VISIT LOW MDM: CPT

## 2020-10-26 RX ORDER — GARLIC EXTRACT 500 MG
1 CAPSULE ORAL DAILY
COMMUNITY
End: 2021-08-30

## 2020-10-26 NOTE — ED PROVIDER NOTES
Patient Seen in: [facility] Emergency Department      History   Patient presents with:  Testing    Stated Complaint: requests covid test    HPI    This is a 78-year-old male who requests a Covid test.  His wife was tested positive for Covid 4 days a Alcohol use: Yes      Comment: 1-2 beers per week    Drug use: No             Review of Systems    Positive for stated complaint: requests covid test  Other systems are as noted in HPI. Constitutional and vital signs reviewed.       All other systems revie

## 2020-10-26 NOTE — ED INITIAL ASSESSMENT (HPI)
Wife with cough and weakness - tested positive for  Covid on Thursday - patient reports no symptoms - requests Covid test

## 2020-10-29 ENCOUNTER — LAB ENCOUNTER (OUTPATIENT)
Dept: LAB | Facility: HOSPITAL | Age: 55
End: 2020-10-29
Attending: FAMILY MEDICINE
Payer: COMMERCIAL

## 2020-10-29 DIAGNOSIS — F32.A DEPRESSION, UNSPECIFIED DEPRESSION TYPE: ICD-10-CM

## 2020-10-29 DIAGNOSIS — R73.09 ELEVATED GLUCOSE: ICD-10-CM

## 2020-10-29 DIAGNOSIS — I10 ESSENTIAL HYPERTENSION: ICD-10-CM

## 2020-10-29 DIAGNOSIS — E55.9 VITAMIN D DEFICIENCY: ICD-10-CM

## 2020-10-29 DIAGNOSIS — Z00.00 LABORATORY EXAMINATION ORDERED AS PART OF A COMPLETE PHYSICAL EXAMINATION: ICD-10-CM

## 2020-10-29 DIAGNOSIS — Z12.5 PROSTATE CANCER SCREENING: ICD-10-CM

## 2020-10-29 DIAGNOSIS — F41.9 ANXIETY: ICD-10-CM

## 2020-10-29 DIAGNOSIS — E78.5 HYPERLIPIDEMIA, UNSPECIFIED HYPERLIPIDEMIA TYPE: ICD-10-CM

## 2020-10-29 DIAGNOSIS — N18.30 CKD (CHRONIC KIDNEY DISEASE), STAGE III (HCC): ICD-10-CM

## 2020-10-29 PROCEDURE — 36415 COLL VENOUS BLD VENIPUNCTURE: CPT

## 2020-10-29 PROCEDURE — 82306 VITAMIN D 25 HYDROXY: CPT

## 2020-10-29 PROCEDURE — 83036 HEMOGLOBIN GLYCOSYLATED A1C: CPT

## 2020-10-29 PROCEDURE — 80061 LIPID PANEL: CPT

## 2020-10-29 PROCEDURE — 80053 COMPREHEN METABOLIC PANEL: CPT

## 2020-10-30 RX ORDER — ALPRAZOLAM 0.5 MG/1
0.5 TABLET, ORALLY DISINTEGRATING ORAL 2 TIMES DAILY PRN
Qty: 24 TABLET | Refills: 1 | Status: SHIPPED | OUTPATIENT
Start: 2020-10-30 | End: 2021-01-08

## 2020-11-05 ENCOUNTER — LAB ENCOUNTER (OUTPATIENT)
Dept: LAB | Facility: HOSPITAL | Age: 55
End: 2020-11-05
Attending: FAMILY MEDICINE
Payer: COMMERCIAL

## 2020-11-05 DIAGNOSIS — Z20.822 CLOSE EXPOSURE TO COVID-19 VIRUS: Primary | ICD-10-CM

## 2020-12-02 RX ORDER — CLINDAMYCIN PHOSPHATE 10 MG/G
1 GEL TOPICAL NIGHTLY PRN
Qty: 60 G | Refills: 0 | Status: SHIPPED | OUTPATIENT
Start: 2020-12-02 | End: 2021-04-03 | Stop reason: ALTCHOICE

## 2020-12-17 DIAGNOSIS — I10 ESSENTIAL HYPERTENSION: ICD-10-CM

## 2020-12-17 RX ORDER — AMLODIPINE BESYLATE 2.5 MG/1
TABLET ORAL
Qty: 90 TABLET | Refills: 1 | Status: SHIPPED | OUTPATIENT
Start: 2020-12-17 | End: 2021-06-27

## 2020-12-21 DIAGNOSIS — I10 ESSENTIAL HYPERTENSION: ICD-10-CM

## 2020-12-21 DIAGNOSIS — M25.562 KNEE PAIN, BILATERAL: ICD-10-CM

## 2020-12-21 DIAGNOSIS — M25.561 KNEE PAIN, BILATERAL: ICD-10-CM

## 2020-12-21 DIAGNOSIS — M17.10 OSTEOARTHROSIS, LOCALIZED, PRIMARY, KNEE: ICD-10-CM

## 2020-12-21 RX ORDER — OMEPRAZOLE 40 MG/1
CAPSULE, DELAYED RELEASE ORAL
Qty: 90 CAPSULE | Refills: 0 | Status: SHIPPED | OUTPATIENT
Start: 2020-12-21 | End: 2021-03-11

## 2020-12-21 RX ORDER — LISINOPRIL 20 MG/1
TABLET ORAL
Qty: 90 TABLET | Refills: 0 | Status: SHIPPED | OUTPATIENT
Start: 2020-12-21 | End: 2021-03-11

## 2020-12-22 ENCOUNTER — TELEMEDICINE (OUTPATIENT)
Dept: FAMILY MEDICINE CLINIC | Facility: CLINIC | Age: 55
End: 2020-12-22

## 2020-12-22 DIAGNOSIS — E55.9 VITAMIN D DEFICIENCY: ICD-10-CM

## 2020-12-22 DIAGNOSIS — R73.01 ELEVATED FASTING GLUCOSE: ICD-10-CM

## 2020-12-22 DIAGNOSIS — E66.01 MORBID OBESITY (HCC): ICD-10-CM

## 2020-12-22 DIAGNOSIS — R60.0 BILATERAL LEG EDEMA: ICD-10-CM

## 2020-12-22 DIAGNOSIS — E78.2 MIXED HYPERLIPIDEMIA: Primary | ICD-10-CM

## 2020-12-22 PROCEDURE — 99214 OFFICE O/P EST MOD 30 MIN: CPT | Performed by: FAMILY MEDICINE

## 2020-12-22 RX ORDER — FUROSEMIDE 20 MG/1
20 TABLET ORAL DAILY
Qty: 7 TABLET | Refills: 0 | Status: SHIPPED | OUTPATIENT
Start: 2020-12-22 | End: 2021-04-03 | Stop reason: ALTCHOICE

## 2020-12-22 RX ORDER — ERGOCALCIFEROL 1.25 MG/1
50000 CAPSULE ORAL WEEKLY
Qty: 12 CAPSULE | Refills: 0 | Status: SHIPPED | OUTPATIENT
Start: 2020-12-22 | End: 2021-06-24

## 2020-12-22 RX ORDER — PHENTERMINE HYDROCHLORIDE 15 MG/1
15 CAPSULE ORAL EVERY MORNING
Qty: 30 CAPSULE | Refills: 2 | Status: SHIPPED | OUTPATIENT
Start: 2020-12-22 | End: 2021-04-03

## 2020-12-22 RX ORDER — ATORVASTATIN CALCIUM 20 MG/1
20 TABLET, FILM COATED ORAL NIGHTLY
Qty: 90 TABLET | Refills: 3 | Status: SHIPPED | OUTPATIENT
Start: 2020-12-22 | End: 2021-08-30
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
## 2021-01-07 ENCOUNTER — PATIENT MESSAGE (OUTPATIENT)
Dept: FAMILY MEDICINE CLINIC | Facility: CLINIC | Age: 56
End: 2021-01-07

## 2021-01-07 DIAGNOSIS — F41.9 ANXIETY: ICD-10-CM

## 2021-01-07 DIAGNOSIS — F32.A DEPRESSION, UNSPECIFIED DEPRESSION TYPE: ICD-10-CM

## 2021-01-08 RX ORDER — ALPRAZOLAM 0.5 MG/1
0.5 TABLET, ORALLY DISINTEGRATING ORAL 2 TIMES DAILY PRN
Qty: 24 TABLET | Refills: 1 | Status: SHIPPED | OUTPATIENT
Start: 2021-01-08 | End: 2021-05-03

## 2021-01-08 NOTE — TELEPHONE ENCOUNTER
From: Sheri Pettit  To: Nesha Quick DO  Sent: 1/7/2021 7:52 PM CST  Subject: Prescription Question    Can I please get a refill of my alprazolam oral disintegrating.  Thank you

## 2021-01-12 DIAGNOSIS — F32.A DEPRESSION, UNSPECIFIED DEPRESSION TYPE: ICD-10-CM

## 2021-01-12 DIAGNOSIS — F41.9 ANXIETY: ICD-10-CM

## 2021-01-12 RX ORDER — ALPRAZOLAM 0.5 MG/1
0.5 TABLET, ORALLY DISINTEGRATING ORAL 2 TIMES DAILY PRN
Qty: 24 TABLET | Refills: 1 | OUTPATIENT
Start: 2021-01-12

## 2021-02-03 DIAGNOSIS — E66.01 MORBID OBESITY (HCC): ICD-10-CM

## 2021-02-05 RX ORDER — PHENTERMINE HYDROCHLORIDE 15 MG/1
15 CAPSULE ORAL EVERY MORNING
Qty: 30 CAPSULE | Refills: 2 | OUTPATIENT
Start: 2021-02-05

## 2021-02-28 DIAGNOSIS — F41.9 ANXIETY AND DEPRESSION: ICD-10-CM

## 2021-02-28 DIAGNOSIS — F32.A ANXIETY AND DEPRESSION: ICD-10-CM

## 2021-03-01 RX ORDER — ESCITALOPRAM OXALATE 20 MG/1
TABLET ORAL
Qty: 90 TABLET | Refills: 1 | Status: SHIPPED | OUTPATIENT
Start: 2021-03-01 | End: 2021-03-12

## 2021-03-09 DIAGNOSIS — M25.561 KNEE PAIN, BILATERAL: ICD-10-CM

## 2021-03-09 DIAGNOSIS — M17.10 OSTEOARTHROSIS, LOCALIZED, PRIMARY, KNEE: ICD-10-CM

## 2021-03-09 DIAGNOSIS — I10 ESSENTIAL HYPERTENSION: ICD-10-CM

## 2021-03-09 DIAGNOSIS — M25.562 KNEE PAIN, BILATERAL: ICD-10-CM

## 2021-03-10 DIAGNOSIS — E55.9 VITAMIN D DEFICIENCY: ICD-10-CM

## 2021-03-10 RX ORDER — ERGOCALCIFEROL 1.25 MG/1
50000 CAPSULE ORAL WEEKLY
Qty: 12 CAPSULE | Refills: 0 | OUTPATIENT
Start: 2021-03-10

## 2021-03-11 RX ORDER — LISINOPRIL 20 MG/1
TABLET ORAL
Qty: 90 TABLET | Refills: 0 | Status: SHIPPED | OUTPATIENT
Start: 2021-03-11 | End: 2021-04-03

## 2021-03-11 RX ORDER — OMEPRAZOLE 40 MG/1
CAPSULE, DELAYED RELEASE ORAL
Qty: 90 CAPSULE | Refills: 0 | Status: SHIPPED | OUTPATIENT
Start: 2021-03-11 | End: 2021-06-02

## 2021-03-31 ENCOUNTER — PATIENT MESSAGE (OUTPATIENT)
Dept: FAMILY MEDICINE CLINIC | Facility: CLINIC | Age: 56
End: 2021-03-31

## 2021-04-01 NOTE — TELEPHONE ENCOUNTER
From: Gsaton Ybarra  To: Jas Argueta DO  Sent: 3/31/2021 8:10 PM CDT  Subject: Other    Dr Nova Dodge, I see your requesting lab work as well. Can I get this done at my appointment on Saturday? Do I need to be fasting?

## 2021-04-02 ENCOUNTER — PATIENT MESSAGE (OUTPATIENT)
Dept: FAMILY MEDICINE CLINIC | Facility: CLINIC | Age: 56
End: 2021-04-02

## 2021-04-02 NOTE — TELEPHONE ENCOUNTER
From: Silas Morgan  To: Carin Sandoval DO  Sent: 4/2/2021 9:09 AM CDT  Subject: Other    Can I receive my first COVID-19 vaccine shot at my appointment tomorrow?

## 2021-04-03 ENCOUNTER — OFFICE VISIT (OUTPATIENT)
Dept: FAMILY MEDICINE CLINIC | Facility: CLINIC | Age: 56
End: 2021-04-03
Payer: COMMERCIAL

## 2021-04-03 VITALS
BODY MASS INDEX: 45.67 KG/M2 | SYSTOLIC BLOOD PRESSURE: 138 MMHG | HEART RATE: 88 BPM | WEIGHT: 291 LBS | DIASTOLIC BLOOD PRESSURE: 64 MMHG | RESPIRATION RATE: 16 BRPM | HEIGHT: 67 IN | TEMPERATURE: 98 F

## 2021-04-03 DIAGNOSIS — L20.82 FLEXURAL ECZEMA: ICD-10-CM

## 2021-04-03 DIAGNOSIS — Z00.00 ANNUAL PHYSICAL EXAM: Primary | ICD-10-CM

## 2021-04-03 DIAGNOSIS — N52.9 ERECTILE DYSFUNCTION, UNSPECIFIED ERECTILE DYSFUNCTION TYPE: ICD-10-CM

## 2021-04-03 DIAGNOSIS — R73.01 ELEVATED FASTING GLUCOSE: ICD-10-CM

## 2021-04-03 DIAGNOSIS — E78.2 MIXED HYPERLIPIDEMIA: ICD-10-CM

## 2021-04-03 DIAGNOSIS — R35.1 NOCTURIA: ICD-10-CM

## 2021-04-03 DIAGNOSIS — I10 ESSENTIAL HYPERTENSION: ICD-10-CM

## 2021-04-03 DIAGNOSIS — E55.9 VITAMIN D DEFICIENCY: ICD-10-CM

## 2021-04-03 DIAGNOSIS — Z23 NEED FOR SHINGLES VACCINE: ICD-10-CM

## 2021-04-03 DIAGNOSIS — Z12.11 COLON CANCER SCREENING: ICD-10-CM

## 2021-04-03 PROCEDURE — 3075F SYST BP GE 130 - 139MM HG: CPT | Performed by: FAMILY MEDICINE

## 2021-04-03 PROCEDURE — 3078F DIAST BP <80 MM HG: CPT | Performed by: FAMILY MEDICINE

## 2021-04-03 PROCEDURE — 3008F BODY MASS INDEX DOCD: CPT | Performed by: FAMILY MEDICINE

## 2021-04-03 PROCEDURE — 90471 IMMUNIZATION ADMIN: CPT | Performed by: FAMILY MEDICINE

## 2021-04-03 PROCEDURE — 99396 PREV VISIT EST AGE 40-64: CPT | Performed by: FAMILY MEDICINE

## 2021-04-03 PROCEDURE — 90750 HZV VACC RECOMBINANT IM: CPT | Performed by: FAMILY MEDICINE

## 2021-04-03 RX ORDER — TAMSULOSIN HYDROCHLORIDE 0.4 MG/1
0.4 CAPSULE ORAL DAILY
Qty: 30 CAPSULE | Refills: 0 | Status: SHIPPED | OUTPATIENT
Start: 2021-04-03 | End: 2021-04-28

## 2021-04-03 RX ORDER — LISINOPRIL AND HYDROCHLOROTHIAZIDE 20; 12.5 MG/1; MG/1
1 TABLET ORAL DAILY
Qty: 90 TABLET | Refills: 1 | Status: SHIPPED | OUTPATIENT
Start: 2021-04-03 | End: 2021-09-24

## 2021-04-03 RX ORDER — PHENTERMINE HYDROCHLORIDE 37.5 MG/1
37.5 TABLET ORAL
Qty: 30 TABLET | Refills: 1 | Status: SHIPPED | OUTPATIENT
Start: 2021-04-03 | End: 2021-06-02

## 2021-04-03 RX ORDER — SILDENAFIL 100 MG/1
100 TABLET, FILM COATED ORAL
Qty: 20 TABLET | Refills: 3 | Status: SHIPPED | OUTPATIENT
Start: 2021-04-03 | End: 2021-04-07

## 2021-04-03 NOTE — H&P
Ky Knott is a 60year old male who presents for a complete physical exam.   HPI:   Hyperlipidemia  This is a chronic problem. The current episode started more than 1 year ago. The problem is uncontrolled.  Recent lipid tests were reviewed and are h problem occurs constantly. The problem has been gradually worsening. Pertinent negatives include no chest pain or headaches. Associated symptoms comments: BMI 45.58 kg/m2. The symptoms are aggravated by stress and eating (depression).  Treatments tried: phe tablet (37.5 mg total) by mouth every morning before breakfast. 30 tablet 1   • Lisinopril-hydroCHLOROthiazide 20-12.5 MG Oral Tab Take 1 tablet by mouth daily.  90 tablet 1   • Sildenafil Citrate 100 MG Oral Tab Take 1 tablet (100 mg total) by mouth daily unspecified whether generalized or localized, unspecified site     left knee pain   • PONV (postoperative nausea and vomiting)    • Unspecified essential hypertension    • Unspecified sleep apnea     uses CPAP   • Visual impairment     glasses      Past Berg unintentional weight gain/loss    EXAM:   /64   Pulse 88   Temp 97.8 °F (36.6 °C) (Temporal)   Resp 16   Ht 5' 7\" (1.702 m)   Wt 291 lb   BMI 45.58 kg/m²       Body mass index is 45.58 kg/m².      GENERAL: well developed, well nourished,in no apparen [Shingrix -Shingles] (12919)    1. Annual physical exam  Due for repeat blood work at this time. Follow up based on results. - CBC WITH DIFFERENTIAL WITH PLATELET; Future  - COMP METABOLIC PANEL (14); Future  - HEMOGLOBIN A1C; Future  - LIPID PANEL;  Futur if needed. - tamsulosin (FLOMAX) cap; Take 1 capsule (0.4 mg total) by mouth daily. Dispense: 30 capsule; Refill: 0    9. Need for shingles vaccine  Given in office.   - ZOSTER VACC RECOMBINANT IM NJX    10.  Flexural eczema  Pt should use topical steroid

## 2021-04-05 ENCOUNTER — PATIENT MESSAGE (OUTPATIENT)
Dept: FAMILY MEDICINE CLINIC | Facility: CLINIC | Age: 56
End: 2021-04-05

## 2021-04-05 RX ORDER — ALBUTEROL SULFATE 90 UG/1
1 AEROSOL, METERED RESPIRATORY (INHALATION) EVERY 4 HOURS PRN
Qty: 8.5 G | Refills: 0 | Status: SHIPPED | OUTPATIENT
Start: 2021-04-05 | End: 2021-04-27

## 2021-04-06 ENCOUNTER — TELEPHONE (OUTPATIENT)
Dept: FAMILY MEDICINE CLINIC | Facility: CLINIC | Age: 56
End: 2021-04-06

## 2021-04-06 NOTE — TELEPHONE ENCOUNTER
Looks like Sildenafil Citrate was sent to pharmacy, they need an alternative medication requested which they put down Vardenafil HCL 20 or Tadalafil 20 mg

## 2021-04-06 NOTE — TELEPHONE ENCOUNTER
From: Aston Pretty  To: Radha Sanz DO  Sent: 4/5/2021 2:32 PM CDT  Subject: Non-Urgent Medical Question    Is it possible to get a lab order with barcodes sent to me so I can have my blood drawn at my work? It has to be sent via email.  Please let m

## 2021-04-07 RX ORDER — TADALAFIL 20 MG/1
20 TABLET ORAL
Qty: 20 TABLET | Refills: 5 | Status: SHIPPED | OUTPATIENT
Start: 2021-04-07 | End: 2021-09-14

## 2021-04-09 RX ORDER — SILDENAFIL 50 MG/1
TABLET, FILM COATED ORAL
Qty: 16 TABLET | Refills: 0 | OUTPATIENT
Start: 2021-04-09

## 2021-04-25 DIAGNOSIS — R35.1 NOCTURIA: ICD-10-CM

## 2021-04-27 RX ORDER — ALBUTEROL SULFATE 90 UG/1
AEROSOL, METERED RESPIRATORY (INHALATION)
Qty: 8.5 INHALER | Refills: 0 | Status: SHIPPED | OUTPATIENT
Start: 2021-04-27 | End: 2021-06-02

## 2021-04-28 RX ORDER — TAMSULOSIN HYDROCHLORIDE 0.4 MG/1
CAPSULE ORAL
Qty: 3090 CAPSULE | Refills: 1 | Status: SHIPPED | OUTPATIENT
Start: 2021-04-28 | End: 2021-09-01 | Stop reason: CLARIF

## 2021-05-03 DIAGNOSIS — F32.A DEPRESSION, UNSPECIFIED DEPRESSION TYPE: ICD-10-CM

## 2021-05-03 DIAGNOSIS — F41.9 ANXIETY: ICD-10-CM

## 2021-05-03 RX ORDER — ALPRAZOLAM 0.5 MG/1
0.5 TABLET, ORALLY DISINTEGRATING ORAL 2 TIMES DAILY PRN
Qty: 24 TABLET | Refills: 1 | Status: SHIPPED | OUTPATIENT
Start: 2021-05-03 | End: 2021-07-19

## 2021-05-03 NOTE — TELEPHONE ENCOUNTER
LR-1/8/21      LOV- 4/3/21        THE REQUEST SAYS PT IS ASKING FOR Alprazolam ODT.  I don't see this medication in the system, I just pended the Alprazolam you had ordered last.

## 2021-05-29 DIAGNOSIS — I10 ESSENTIAL HYPERTENSION: ICD-10-CM

## 2021-05-29 DIAGNOSIS — M25.561 KNEE PAIN, BILATERAL: ICD-10-CM

## 2021-05-29 DIAGNOSIS — M17.10 OSTEOARTHROSIS, LOCALIZED, PRIMARY, KNEE: ICD-10-CM

## 2021-05-29 DIAGNOSIS — M25.562 KNEE PAIN, BILATERAL: ICD-10-CM

## 2021-05-30 DIAGNOSIS — I10 ESSENTIAL HYPERTENSION: ICD-10-CM

## 2021-06-01 RX ORDER — PHENTERMINE HYDROCHLORIDE 37.5 MG/1
37.5 TABLET ORAL
Qty: 30 TABLET | Refills: 1 | OUTPATIENT
Start: 2021-06-01

## 2021-06-02 RX ORDER — OMEPRAZOLE 40 MG/1
CAPSULE, DELAYED RELEASE ORAL
Qty: 90 CAPSULE | Refills: 0 | Status: ON HOLD | OUTPATIENT
Start: 2021-06-02 | End: 2021-08-30

## 2021-06-02 RX ORDER — ALBUTEROL SULFATE 90 UG/1
1 AEROSOL, METERED RESPIRATORY (INHALATION) EVERY 4 HOURS PRN
Qty: 1 EACH | Refills: 1 | Status: SHIPPED | OUTPATIENT
Start: 2021-06-02 | End: 2021-09-07

## 2021-06-02 RX ORDER — PHENTERMINE HYDROCHLORIDE 37.5 MG/1
TABLET ORAL
Qty: 30 TABLET | Refills: 1 | Status: SHIPPED | OUTPATIENT
Start: 2021-06-02 | End: 2021-08-18

## 2021-06-02 NOTE — TELEPHONE ENCOUNTER
LR 3/11/21      LR 4/27/21 Albuterol      LR 4/3/21 PHENTERMINE, it wouldn't let me decline the phentermine to say appt needed      LOV 4/3/21

## 2021-06-04 DIAGNOSIS — I10 ESSENTIAL HYPERTENSION: ICD-10-CM

## 2021-06-04 RX ORDER — LISINOPRIL 20 MG/1
TABLET ORAL
Qty: 90 TABLET | Refills: 0 | Status: SHIPPED | OUTPATIENT
Start: 2021-06-04 | End: 2021-06-24

## 2021-06-24 ENCOUNTER — OFFICE VISIT (OUTPATIENT)
Dept: FAMILY MEDICINE CLINIC | Facility: CLINIC | Age: 56
End: 2021-06-24
Payer: COMMERCIAL

## 2021-06-24 VITALS
HEART RATE: 92 BPM | OXYGEN SATURATION: 100 % | SYSTOLIC BLOOD PRESSURE: 118 MMHG | TEMPERATURE: 98 F | WEIGHT: 298 LBS | DIASTOLIC BLOOD PRESSURE: 58 MMHG | BODY MASS INDEX: 46.77 KG/M2 | RESPIRATION RATE: 20 BRPM | HEIGHT: 67 IN

## 2021-06-24 DIAGNOSIS — R73.03 PREDIABETES: ICD-10-CM

## 2021-06-24 DIAGNOSIS — R07.89 ATYPICAL CHEST PAIN: Primary | ICD-10-CM

## 2021-06-24 DIAGNOSIS — E66.2 OBESITY HYPOVENTILATION SYNDROME (HCC): ICD-10-CM

## 2021-06-24 DIAGNOSIS — E78.2 MIXED HYPERLIPIDEMIA: ICD-10-CM

## 2021-06-24 DIAGNOSIS — G59 MONONEUROPATHY DUE TO UNDERLYING DISEASE: ICD-10-CM

## 2021-06-24 DIAGNOSIS — G47.33 OBSTRUCTIVE SLEEP APNEA: ICD-10-CM

## 2021-06-24 DIAGNOSIS — R06.02 SHORTNESS OF BREATH: ICD-10-CM

## 2021-06-24 PROCEDURE — 3008F BODY MASS INDEX DOCD: CPT | Performed by: FAMILY MEDICINE

## 2021-06-24 PROCEDURE — 3074F SYST BP LT 130 MM HG: CPT | Performed by: FAMILY MEDICINE

## 2021-06-24 PROCEDURE — 3078F DIAST BP <80 MM HG: CPT | Performed by: FAMILY MEDICINE

## 2021-06-24 PROCEDURE — 99214 OFFICE O/P EST MOD 30 MIN: CPT | Performed by: FAMILY MEDICINE

## 2021-06-24 PROCEDURE — 93000 ELECTROCARDIOGRAM COMPLETE: CPT | Performed by: FAMILY MEDICINE

## 2021-06-24 RX ORDER — GABAPENTIN 100 MG/1
100 CAPSULE ORAL 3 TIMES DAILY
Qty: 90 CAPSULE | Refills: 2 | Status: SHIPPED | OUTPATIENT
Start: 2021-06-24

## 2021-06-24 NOTE — PROGRESS NOTES
HPI/Subjective:   Patient ID: Caleb Jimenes is a 60year old male. Prediabetes  Hx of prediabetes. This is a chronic problem. This problem onset over 1 year ago. This problem occurs constantly. Pts last a1c on 10/29/20 was 5.6. Pt is not symptomatic. comments: + numbness in upper right extremity. Exacerbated by: holding phone to ear. Treatments tried: tried 100 MG of his wifes gabapentin. The treatment provided moderate relief. Hyperlipidemia  This is a chronic problem.  The current episode started mo total) by mouth 2 (two) times daily as needed for Anxiety. 24 tablet 1   • tamsulosin (FLOMAX) cap TAKE 1 CAPSULE BY MOUTH EVERY DAY 3090 capsule 1   • Tadalafil 20 MG Oral Tab Take 1 tablet (20 mg total) by mouth daily as needed for Erectile Dysfunction. (primary encounter diagnosis)  Shortness of breath  Obstructive sleep apnea  Obesity hypoventilation syndrome (HCC)  Mixed hyperlipidemia  Prediabetes  Mononeuropathy due to underlying disease    1.  Atypical chest pain  EKG is not suggestive of cardiac cau (three) times daily. Dispense: 90 capsule;  Refill: 2      Orders Placed This Encounter      CBC W Differential W Platelet [E]      Comp Metabolic Panel (14) [E]      Lipid Panel [E]      Hemoglobin A1C [E]      Meds This Visit:  Requested Prescriptions

## 2021-06-27 ENCOUNTER — MOBILE ENCOUNTER (OUTPATIENT)
Dept: FAMILY MEDICINE CLINIC | Facility: CLINIC | Age: 56
End: 2021-06-27

## 2021-06-27 DIAGNOSIS — I10 ESSENTIAL HYPERTENSION: ICD-10-CM

## 2021-06-27 RX ORDER — AMLODIPINE BESYLATE 2.5 MG/1
TABLET ORAL
Qty: 90 TABLET | Refills: 0 | Status: SHIPPED | OUTPATIENT
Start: 2021-06-27 | End: 2021-08-30

## 2021-07-16 DIAGNOSIS — F32.A DEPRESSION, UNSPECIFIED DEPRESSION TYPE: ICD-10-CM

## 2021-07-16 DIAGNOSIS — F41.9 ANXIETY: ICD-10-CM

## 2021-07-18 DIAGNOSIS — I10 ESSENTIAL HYPERTENSION: ICD-10-CM

## 2021-07-19 DIAGNOSIS — F32.A DEPRESSION, UNSPECIFIED DEPRESSION TYPE: ICD-10-CM

## 2021-07-19 DIAGNOSIS — F41.9 ANXIETY: ICD-10-CM

## 2021-07-19 DIAGNOSIS — I10 ESSENTIAL HYPERTENSION: ICD-10-CM

## 2021-07-19 RX ORDER — ALPRAZOLAM 0.5 MG/1
0.5 TABLET, ORALLY DISINTEGRATING ORAL 2 TIMES DAILY PRN
Qty: 24 TABLET | Refills: 1 | Status: SHIPPED | OUTPATIENT
Start: 2021-07-19 | End: 2021-08-11

## 2021-07-19 RX ORDER — ALPRAZOLAM 0.5 MG/1
TABLET, ORALLY DISINTEGRATING ORAL
Qty: 24 TABLET | Refills: 1 | OUTPATIENT
Start: 2021-07-19

## 2021-07-19 RX ORDER — PHENTERMINE HYDROCHLORIDE 37.5 MG/1
37.5 TABLET ORAL
Qty: 30 TABLET | Refills: 1 | OUTPATIENT
Start: 2021-07-19

## 2021-07-19 NOTE — TELEPHONE ENCOUNTER
Rx Request  ALPRAZolam 0.5 MG Oral Tablet Dispersible    Disp:      24              R:  1    Associated Dx:  Anxiety, depression    Last Refilled: 05/03/2021    Last Visit: 06/24/2021

## 2021-07-22 RX ORDER — PHENTERMINE HYDROCHLORIDE 37.5 MG/1
TABLET ORAL
Qty: 30 TABLET | Refills: 1 | OUTPATIENT
Start: 2021-07-22

## 2021-07-27 NOTE — TELEPHONE ENCOUNTER
Sent patient a my chart message about needing to have the stress test done and also for the test to be negative in order for any future refills.

## 2021-08-11 DIAGNOSIS — F41.9 ANXIETY: ICD-10-CM

## 2021-08-11 DIAGNOSIS — F32.A DEPRESSION, UNSPECIFIED DEPRESSION TYPE: ICD-10-CM

## 2021-08-14 ENCOUNTER — HOSPITAL ENCOUNTER (OUTPATIENT)
Dept: CV DIAGNOSTICS | Facility: HOSPITAL | Age: 56
Discharge: HOME | End: 2021-08-14
Attending: FAMILY MEDICINE
Payer: COMMERCIAL

## 2021-08-14 DIAGNOSIS — R06.02 SHORTNESS OF BREATH: ICD-10-CM

## 2021-08-14 DIAGNOSIS — R07.89 ATYPICAL CHEST PAIN: ICD-10-CM

## 2021-08-14 PROCEDURE — 78452 HT MUSCLE IMAGE SPECT MULT: CPT | Performed by: FAMILY MEDICINE

## 2021-08-14 PROCEDURE — 93017 CV STRESS TEST TRACING ONLY: CPT | Performed by: FAMILY MEDICINE

## 2021-08-14 PROCEDURE — 93018 CV STRESS TEST I&R ONLY: CPT | Performed by: FAMILY MEDICINE

## 2021-08-14 RX ORDER — ALPRAZOLAM 0.5 MG/1
0.5 TABLET, ORALLY DISINTEGRATING ORAL 2 TIMES DAILY PRN
Qty: 24 TABLET | Refills: 1 | Status: SHIPPED | OUTPATIENT
Start: 2021-08-14 | End: 2021-12-14

## 2021-08-30 ENCOUNTER — APPOINTMENT (OUTPATIENT)
Dept: GENERAL RADIOLOGY | Facility: HOSPITAL | Age: 56
DRG: 193 | End: 2021-08-30
Attending: EMERGENCY MEDICINE
Payer: COMMERCIAL

## 2021-08-30 ENCOUNTER — APPOINTMENT (OUTPATIENT)
Dept: NUCLEAR MEDICINE | Facility: HOSPITAL | Age: 56
DRG: 193 | End: 2021-08-30
Attending: EMERGENCY MEDICINE
Payer: COMMERCIAL

## 2021-08-30 ENCOUNTER — HOSPITAL ENCOUNTER (INPATIENT)
Facility: HOSPITAL | Age: 56
LOS: 1 days | Discharge: HOME | DRG: 193 | End: 2021-08-31
Attending: EMERGENCY MEDICINE | Admitting: HOSPITALIST
Payer: COMMERCIAL

## 2021-08-30 DIAGNOSIS — N17.9 AKI (ACUTE KIDNEY INJURY) (HCC): ICD-10-CM

## 2021-08-30 DIAGNOSIS — J44.1 COPD EXACERBATION (HCC): ICD-10-CM

## 2021-08-30 DIAGNOSIS — M25.562 KNEE PAIN, BILATERAL: ICD-10-CM

## 2021-08-30 DIAGNOSIS — M25.561 KNEE PAIN, BILATERAL: ICD-10-CM

## 2021-08-30 DIAGNOSIS — J96.01 ACUTE HYPOXEMIC RESPIRATORY FAILURE (HCC): Primary | ICD-10-CM

## 2021-08-30 DIAGNOSIS — M17.10 OSTEOARTHROSIS, LOCALIZED, PRIMARY, KNEE: ICD-10-CM

## 2021-08-30 DIAGNOSIS — J18.9 COMMUNITY ACQUIRED PNEUMONIA OF RIGHT LUNG, UNSPECIFIED PART OF LUNG: ICD-10-CM

## 2021-08-30 LAB
ADENOVIRUS PCR:: NOT DETECTED
ALBUMIN SERPL-MCNC: 3.3 G/DL (ref 3.4–5)
ALBUMIN/GLOB SERPL: 0.8 {RATIO} (ref 1–2)
ALP LIVER SERPL-CCNC: 70 U/L
ALT SERPL-CCNC: 72 U/L
ANION GAP SERPL CALC-SCNC: 10 MMOL/L (ref 0–18)
APTT PPP: 24.3 SECONDS (ref 25.4–36.1)
ARTERIAL PATENCY WRIST A: POSITIVE
AST SERPL-CCNC: 59 U/L (ref 15–37)
ATRIAL RATE: 145 BPM
B PARAPERT DNA SPEC QL NAA+PROBE: NOT DETECTED
B PERT DNA SPEC QL NAA+PROBE: NOT DETECTED
BASE EXCESS BLDA CALC-SCNC: -5.2 MMOL/L (ref ?–2)
BASOPHILS # BLD AUTO: 0.02 X10(3) UL (ref 0–0.2)
BASOPHILS NFR BLD AUTO: 0.5 %
BILIRUB SERPL-MCNC: 0.7 MG/DL (ref 0.1–2)
BODY TEMPERATURE: 97.6 F
BUN BLD-MCNC: 51 MG/DL (ref 7–18)
C PNEUM DNA SPEC QL NAA+PROBE: NOT DETECTED
CALCIUM BLD-MCNC: 9.4 MG/DL (ref 8.5–10.1)
CHLORIDE SERPL-SCNC: 103 MMOL/L (ref 98–112)
CO2 SERPL-SCNC: 21 MMOL/L (ref 21–32)
COHGB MFR BLD: 0.7 % SAT (ref 0–3)
CORONAVIRUS 229E PCR:: NOT DETECTED
CORONAVIRUS HKU1 PCR:: NOT DETECTED
CORONAVIRUS NL63 PCR:: NOT DETECTED
CORONAVIRUS OC43 PCR:: NOT DETECTED
CREAT BLD-MCNC: 2.14 MG/DL
D-DIMER: 2.3 UG/ML FEU (ref ?–0.58)
EOSINOPHIL # BLD AUTO: 0.01 X10(3) UL (ref 0–0.7)
EOSINOPHIL NFR BLD AUTO: 0.2 %
ERYTHROCYTE [DISTWIDTH] IN BLOOD BY AUTOMATED COUNT: 12.8 %
EXPIRATORY PRESSURE: 6 CM H2O
FIO2: 100 %
FLUAV RNA SPEC QL NAA+PROBE: NOT DETECTED
FLUBV RNA SPEC QL NAA+PROBE: NOT DETECTED
GLOBULIN PLAS-MCNC: 4.2 G/DL (ref 2.8–4.4)
GLUCOSE BLD-MCNC: 129 MG/DL (ref 70–99)
HCO3 BLDA-SCNC: 21.2 MEQ/L (ref 22–26)
HCT VFR BLD AUTO: 43.9 %
HGB BLD-MCNC: 13.4 G/DL
HGB BLD-MCNC: 14.9 G/DL
IMM GRANULOCYTES # BLD AUTO: 0.01 X10(3) UL (ref 0–1)
IMM GRANULOCYTES NFR BLD: 0.2 %
INR BLD: 1.12 (ref 0.89–1.11)
INSP PRESSURE: 12 CM H2O
LACTATE SERPL-SCNC: 1 MMOL/L (ref 0.4–2)
LACTATE SERPL-SCNC: 2.2 MMOL/L (ref 0.4–2)
LYMPHOCYTES # BLD AUTO: 0.31 X10(3) UL (ref 1–4)
LYMPHOCYTES NFR BLD AUTO: 7.4 %
M PROTEIN MFR SERPL ELPH: 7.5 G/DL (ref 6.4–8.2)
MCH RBC QN AUTO: 31.1 PG (ref 26–34)
MCHC RBC AUTO-ENTMCNC: 33.9 G/DL (ref 31–37)
MCV RBC AUTO: 91.6 FL
METAPNEUMOVIRUS PCR:: NOT DETECTED
METHGB MFR BLD: 0.7 % SAT (ref 0.4–1.5)
MONOCYTES # BLD AUTO: 0.08 X10(3) UL (ref 0.1–1)
MONOCYTES NFR BLD AUTO: 1.9 %
MYCOPLASMA PNEUMONIA PCR:: NOT DETECTED
NEUTROPHILS # BLD AUTO: 3.77 X10 (3) UL (ref 1.5–7.7)
NEUTROPHILS # BLD AUTO: 3.77 X10(3) UL (ref 1.5–7.7)
NEUTROPHILS NFR BLD AUTO: 89.8 %
NT-PROBNP SERPL-MCNC: 47 PG/ML (ref ?–125)
OSMOLALITY SERPL CALC.SUM OF ELEC: 293 MOSM/KG (ref 275–295)
P-R INTERVAL: 136 MS
P/F RATIO: 1551.2 MMHG
PARAINFLUENZA 1 PCR:: NOT DETECTED
PARAINFLUENZA 2 PCR:: NOT DETECTED
PARAINFLUENZA 3 PCR:: NOT DETECTED
PARAINFLUENZA 4 PCR:: NOT DETECTED
PCO2 BLDA: 44 MM HG (ref 35–45)
PH BLDA: 7.3 [PH] (ref 7.35–7.45)
PLATELET # BLD AUTO: 219 10(3)UL (ref 150–450)
PO2 BLDA: 323 MM HG (ref 80–105)
POTASSIUM SERPL-SCNC: 4.7 MMOL/L (ref 3.5–5.1)
PROCALCITONIN SERPL-MCNC: 0.45 NG/ML (ref ?–0.16)
PSA SERPL DL<=0.01 NG/ML-MCNC: 14.7 SECONDS (ref 12.2–14.5)
Q-T INTERVAL: 300 MS
QRS DURATION: 132 MS
QTC CALCULATION (BEZET): 466 MS
R AXIS: 202 DEGREES
RBC # BLD AUTO: 4.79 X10(6)UL
RHINOVIRUS/ENTERO PCR:: NOT DETECTED
RSV RNA SPEC QL NAA+PROBE: NOT DETECTED
SAO2 % BLDA FROM PO2: 100 % (ref 92–100)
SAO2 % BLDA: 98 % (ref 92–100)
SARS-COV-2 RNA NPH QL NAA+NON-PROBE: NOT DETECTED
SARS-COV-2 RNA RESP QL NAA+PROBE: NOT DETECTED
SODIUM SERPL-SCNC: 134 MMOL/L (ref 136–145)
T AXIS: 40 DEGREES
TROPONIN I SERPL-MCNC: <0.045 NG/ML (ref ?–0.04)
VENTRICULAR RATE: 145 BPM
WBC # BLD AUTO: 4.2 X10(3) UL (ref 4–11)

## 2021-08-30 PROCEDURE — 5A09357 ASSISTANCE WITH RESPIRATORY VENTILATION, LESS THAN 24 CONSECUTIVE HOURS, CONTINUOUS POSITIVE AIRWAY PRESSURE: ICD-10-PCS | Performed by: EMERGENCY MEDICINE

## 2021-08-30 PROCEDURE — 78582 LUNG VENTILAT&PERFUS IMAGING: CPT | Performed by: EMERGENCY MEDICINE

## 2021-08-30 PROCEDURE — 71045 X-RAY EXAM CHEST 1 VIEW: CPT | Performed by: EMERGENCY MEDICINE

## 2021-08-30 PROCEDURE — 99223 1ST HOSP IP/OBS HIGH 75: CPT | Performed by: INTERNAL MEDICINE

## 2021-08-30 RX ORDER — OMEPRAZOLE 40 MG/1
CAPSULE, DELAYED RELEASE ORAL
Qty: 90 CAPSULE | Refills: 0 | Status: SHIPPED | OUTPATIENT
Start: 2021-08-30 | End: 2021-11-20

## 2021-08-30 RX ORDER — ACETAMINOPHEN 325 MG/1
650 TABLET ORAL EVERY 6 HOURS PRN
Status: DISCONTINUED | OUTPATIENT
Start: 2021-08-30 | End: 2021-08-31

## 2021-08-30 RX ORDER — SODIUM CHLORIDE 9 MG/ML
INJECTION, SOLUTION INTRAVENOUS CONTINUOUS
Status: DISCONTINUED | OUTPATIENT
Start: 2021-08-30 | End: 2021-08-31

## 2021-08-30 RX ORDER — IPRATROPIUM BROMIDE AND ALBUTEROL SULFATE 2.5; .5 MG/3ML; MG/3ML
3 SOLUTION RESPIRATORY (INHALATION) EVERY 4 HOURS PRN
Status: DISCONTINUED | OUTPATIENT
Start: 2021-08-30 | End: 2021-08-31

## 2021-08-30 RX ORDER — PANTOPRAZOLE SODIUM 40 MG/1
40 TABLET, DELAYED RELEASE ORAL
Status: DISCONTINUED | OUTPATIENT
Start: 2021-08-31 | End: 2021-08-30

## 2021-08-30 RX ORDER — PROCHLORPERAZINE EDISYLATE 5 MG/ML
5 INJECTION INTRAMUSCULAR; INTRAVENOUS EVERY 8 HOURS PRN
Status: DISCONTINUED | OUTPATIENT
Start: 2021-08-30 | End: 2021-08-31

## 2021-08-30 RX ORDER — ALBUTEROL SULFATE 90 UG/1
1 AEROSOL, METERED RESPIRATORY (INHALATION) EVERY 4 HOURS PRN
Status: DISCONTINUED | OUTPATIENT
Start: 2021-08-30 | End: 2021-08-31

## 2021-08-30 RX ORDER — GABAPENTIN 100 MG/1
100 CAPSULE ORAL 3 TIMES DAILY
Status: DISCONTINUED | OUTPATIENT
Start: 2021-08-30 | End: 2021-08-31

## 2021-08-30 RX ORDER — AMLODIPINE BESYLATE 5 MG/1
5 TABLET ORAL DAILY
Status: DISCONTINUED | OUTPATIENT
Start: 2021-08-31 | End: 2021-08-31

## 2021-08-30 RX ORDER — MELATONIN
3 NIGHTLY PRN
Status: DISCONTINUED | OUTPATIENT
Start: 2021-08-30 | End: 2021-08-31

## 2021-08-30 RX ORDER — HEPARIN SODIUM 5000 [USP'U]/ML
7500 INJECTION, SOLUTION INTRAVENOUS; SUBCUTANEOUS EVERY 8 HOURS SCHEDULED
Status: DISCONTINUED | OUTPATIENT
Start: 2021-08-30 | End: 2021-08-31

## 2021-08-30 RX ORDER — IBUPROFEN 200 MG
400 TABLET ORAL DAILY
COMMUNITY

## 2021-08-30 RX ORDER — TAMSULOSIN HYDROCHLORIDE 0.4 MG/1
0.4 CAPSULE ORAL DAILY
Status: DISCONTINUED | OUTPATIENT
Start: 2021-08-30 | End: 2021-08-31

## 2021-08-30 RX ORDER — ONDANSETRON 2 MG/ML
4 INJECTION INTRAMUSCULAR; INTRAVENOUS EVERY 6 HOURS PRN
Status: DISCONTINUED | OUTPATIENT
Start: 2021-08-30 | End: 2021-08-31

## 2021-08-30 RX ORDER — METHYLPREDNISOLONE SODIUM SUCCINATE 125 MG/2ML
60 INJECTION, POWDER, LYOPHILIZED, FOR SOLUTION INTRAMUSCULAR; INTRAVENOUS ONCE
Status: COMPLETED | OUTPATIENT
Start: 2021-08-30 | End: 2021-08-30

## 2021-08-30 RX ORDER — ONDANSETRON 2 MG/ML
4 INJECTION INTRAMUSCULAR; INTRAVENOUS EVERY 4 HOURS PRN
Status: CANCELLED | OUTPATIENT
Start: 2021-08-30 | End: 2021-08-30

## 2021-08-30 RX ORDER — PRAVASTATIN SODIUM 20 MG
20 TABLET ORAL NIGHTLY
Status: DISCONTINUED | OUTPATIENT
Start: 2021-08-30 | End: 2021-08-31

## 2021-08-30 RX ORDER — PANTOPRAZOLE SODIUM 40 MG/1
40 TABLET, DELAYED RELEASE ORAL
Status: DISCONTINUED | OUTPATIENT
Start: 2021-08-30 | End: 2021-08-31

## 2021-08-30 RX ORDER — IPRATROPIUM BROMIDE AND ALBUTEROL SULFATE 2.5; .5 MG/3ML; MG/3ML
SOLUTION RESPIRATORY (INHALATION)
Status: COMPLETED
Start: 2021-08-30 | End: 2021-08-30

## 2021-08-30 NOTE — H&P
[facility]  History and Physical     Nelly Journey Patient Status:  Emergency    1965 MRN [id number]   [facility] Konstantin Baird DO     Chief Comp skin  Dilaudid [[employer]*    ITCHING, NAUSEA ONLY, JITTERY  Milk                    DIARRHEA, NAUSEA AND VOMITING  Vitamin D               DIARRHEA, PAIN  Vitamin D, Ergocalc*    DIARRHEA, PAIN    Medications:  No current facility-administered medications Temp 97.6 °F (36.4 °C) (Temporal)   Resp 21   Ht 5' 6\" (1.676 m)   Wt 279 lb (126.6 kg)   SpO2 94%   BMI 45.03 kg/m²   General: No acute distress. Alert and oriented x 3. HEENT: Normocephalic atraumatic. Moist mucous membranes. EOM-I. PERRLA. Anicteric. now on 3L NC  2. VQ scan pending   3. A started for PNA  4. Steroid x1 given, will hold off on further steroid for now per exam and course   2. Acute kidney injury - CKD (1.3)  1. IVF  2. Monitor UO  3. Per course ill consider US kidney  3.  Probable RLL PN

## 2021-08-30 NOTE — PROGRESS NOTES
[facility] Pharmacy Note: Antimicrobial Weight Based Dose Adjustment for: ceftriaxone (Real [place]ume)    Lan Alcantara is a 60year old patient who has been prescribed ceftriaxone (ROCEPHIN) 1000 mg x 1 in ER  Estimated Creatinine Clearance: 34 mL/min (A) (based o

## 2021-08-30 NOTE — CONSULTS
[facility] Pharmacy Note:  Anticoagulation Weight Dose Adjustment for heparin    Aston Pretty is a 60year old patient who has been prescribed heparin 5000 units every 8 hours.       Estimated Creatinine Clearance: 34 mL/min (A) (based on SCr of 2.14 mg/dL (H

## 2021-08-30 NOTE — ED QUICK NOTES
Rounding Completed    Pt resting comfortably on stretcher, wife at bedside. Remaining fluids hung and vitals collected.

## 2021-08-30 NOTE — PLAN OF CARE
Problem: Patient/Family Goals  Goal: Patient/Family Long Term Goal  Description: Patient's Long Term Goal: Go home and stay home    Interventions:  - IS encourage ambulation, meds per [facility]  - See additional Care Plan goals for specific interventions  Outc

## 2021-08-30 NOTE — ED QUICK NOTES
Pt off of Bipap at this time, on 2L NC as trial to see if patient can go to [address] without Bipap and tolerate it

## 2021-08-30 NOTE — ED PROVIDER NOTES
Patient Seen in: [facility] Emergency Department      History   Patient presents with:  Difficulty Breathing    Stated Complaint: IRIS    HPI/Subjective:   HPI    56-year-old male with past medical history of hypertension, asthma, COPD presents today complaint: IRIS  Other systems are as noted in HPI. Constitutional and vital signs reviewed. All other systems reviewed and negative except as noted above.     Physical Exam     ED Triage Vitals   BP 08/30/21 0710 95/70   Pulse 08/30/21 0710 (!) 146 59 (*)     ALT 72 (*)     Albumin 3.3 (*)     A/G Ratio 0.8 (*)     All other components within normal limits   LACTIC ACID, PLASMA - Abnormal; Notable for the following components:    Lactic Acid 2.2 (*)     All other components within normal limits   D-D being used under the Food and Drug Administration's Emergency Use Authorization. The authorized Fact Sheet for Healthcare Providers for this assay is available upon request from the laboratory.     SARS and MERS coronaviruses are not tested on this assay intravenous infusion of regadenoson at a dose of 0.4 mg over 15 to 20 seconds followed immediately by 5 mL saline flush. The electrocardiographic response to regadenoson infusion was normal.  The patient denied cardiac symptoms. No arrhythmias noted.  SCIN 8/30/2021  PROCEDURE:  NM LUNG VQ VENT / PERFUSION SCAN  (GZZ=29824)  COMPARISON:  [facility] , XR, XR CHEST AP PORTABLE  (CPT=71045), 8/30/2021, 7:25 AM.  INDICATIONS:  eval for PE  TECHNIQUE:  The patient was ventilated with 12.3 mCi Xe-133 gas and posterior Will try patient on nasal cannula. He does appear to have an acute kidney injury. His D-dimer is elevated as well, will order VQ scan. Patient's chest x-ray reveals a right-sided consolidation concerning for pneumonia, empiric antibiotics ordered.   Belle

## 2021-08-31 VITALS
BODY MASS INDEX: 44.93 KG/M2 | WEIGHT: 279.56 LBS | HEIGHT: 65.98 IN | HEART RATE: 79 BPM | TEMPERATURE: 98 F | RESPIRATION RATE: 18 BRPM | SYSTOLIC BLOOD PRESSURE: 115 MMHG | OXYGEN SATURATION: 98 % | DIASTOLIC BLOOD PRESSURE: 64 MMHG

## 2021-08-31 PROBLEM — J44.1 COPD EXACERBATION (HCC): Status: RESOLVED | Noted: 2021-08-30 | Resolved: 2021-08-31

## 2021-08-31 LAB
ALBUMIN SERPL-MCNC: 2.7 G/DL (ref 3.4–5)
ALBUMIN/GLOB SERPL: 0.7 {RATIO} (ref 1–2)
ALP LIVER SERPL-CCNC: 52 U/L
ALT SERPL-CCNC: 58 U/L
ANION GAP SERPL CALC-SCNC: 4 MMOL/L (ref 0–18)
AST SERPL-CCNC: 37 U/L (ref 15–37)
BILIRUB SERPL-MCNC: 0.3 MG/DL (ref 0.1–2)
BUN BLD-MCNC: 37 MG/DL (ref 7–18)
CALCIUM BLD-MCNC: 8.4 MG/DL (ref 8.5–10.1)
CHLORIDE SERPL-SCNC: 111 MMOL/L (ref 98–112)
CO2 SERPL-SCNC: 24 MMOL/L (ref 21–32)
CREAT BLD-MCNC: 1.23 MG/DL
GLOBULIN PLAS-MCNC: 3.8 G/DL (ref 2.8–4.4)
GLUCOSE BLD-MCNC: 121 MG/DL (ref 70–99)
M PROTEIN MFR SERPL ELPH: 6.5 G/DL (ref 6.4–8.2)
OSMOLALITY SERPL CALC.SUM OF ELEC: 298 MOSM/KG (ref 275–295)
POTASSIUM SERPL-SCNC: 4.7 MMOL/L (ref 3.5–5.1)
SODIUM SERPL-SCNC: 139 MMOL/L (ref 136–145)

## 2021-08-31 PROCEDURE — 99239 HOSP IP/OBS DSCHRG MGMT >30: CPT | Performed by: INTERNAL MEDICINE

## 2021-08-31 RX ORDER — AZITHROMYCIN 500 MG/1
500 TABLET, FILM COATED ORAL DAILY
Qty: 1 TABLET | Refills: 0 | Status: SHIPPED | OUTPATIENT
Start: 2021-09-01 | End: 2021-09-02

## 2021-08-31 RX ORDER — CEPHALEXIN 500 MG/1
500 CAPSULE ORAL 4 TIMES DAILY
Qty: 20 CAPSULE | Refills: 0 | Status: SHIPPED | OUTPATIENT
Start: 2021-09-01 | End: 2021-09-06

## 2021-08-31 RX ORDER — IPRATROPIUM BROMIDE AND ALBUTEROL SULFATE 2.5; .5 MG/3ML; MG/3ML
3 SOLUTION RESPIRATORY (INHALATION) EVERY 4 HOURS PRN
Qty: 3 ML | Refills: 1 | Status: SHIPPED | OUTPATIENT
Start: 2021-08-31

## 2021-08-31 NOTE — PLAN OF CARE
Problem: Patient/Family Goals  Goal: Patient/Family Long Term Goal  Description: Patient's Long Term Goal: to discharge home    Interventions:  - follow plan of care  - See additional Care Plan goals for specific interventions  Outcome: Progressing  Goal own health  - Refer to Case Management Department for coordinating discharge planning if the patient needs post-hospital services based on physician/LIP order or complex needs related to functional status, cognitive ability or social support system  Outcom

## 2021-08-31 NOTE — DISCHARGE PLANNING
NURSING DISCHARGE NOTE    Discharged Home via Wheelchair. Accompanied by Support staff  Belongings Taken by patient/family. PIVs removed. Tele box removed & placed in drawer. Discharge navigator complete.    Discharge instructions reviewed with delaney

## 2021-08-31 NOTE — PLAN OF CARE
Received patient A/O x4, BP on the lower side but stable during shift. Maintaining O2 saturation on 3L nasal cannula, baseline is room air. CPAP at night. . Tele SR. Patient is up with standby assistance.  Voiding via urinal. Continue IV fluids and antib Therapy support as indicated  - Manage/alleviate anxiety  - Monitor for signs/symptoms of CO2 retention  Outcome: Progressing     Problem: DISCHARGE PLANNING  Goal: Discharge to home or other facility with appropriate resources  Description: INTERVENTIONS:

## 2021-08-31 NOTE — PAYOR COMM NOTE
--------------  ADMISSION REVIEW     Payor: [facility]  Subscriber #:  [id number]  Authorization Number: [id number]    Admit date: 8/30/21  Admit time:  1:35 PM     Patient Seen in: [facility] Emergency Department    History   HPI  58-yea Value    Glucose 129 (*)     Sodium 134 (*)     BUN 51 (*)     Creatinine 2.14 (*)     GFR, Non- 33 (*)     GFR, -American 38 (*)     AST 59 (*)     ALT 72 (*)     Albumin 3.3 (*)     A/G Ratio 0.8 (*)     All other components within Disposition:  Admit  8/30/2021  9:45 am      History and Physical   Chief Complaint: SOB  History of Present Illness: Lan Alcatnara is a 60year old male with obesity, ZEB, HTN, CKD presented with suddent onset SOB since 4 am. No fever, chills, N/V 1. Improved since admission, now on 3L NC  2. VQ scan pending   3. A started for PNA  4. Steroid x1 given, will hold off on further steroid for now per exam and course   2. Acute kidney injury - CKD (1.3)  1. IVF  2.  Monitor UO  3. Per course ill conside 8/30/2021 2045 Given 20 mg Oral Yasmany Long RN      0.9% NaCl infusion     Date Action Dose Route User    8/31/2021 0253 Lucero 37 (none) Intravenous Yasmany Long RN    8/30/2021 1618 New Bag (none) Intravenous Robles Fermin, RN      [facility]

## 2021-08-31 NOTE — PROGRESS NOTES
Patient is alert and oriented. Oxygen saturation received on room air, weaned to room air with O2 saturation >90% on room air at rest and with exertion. Wheezing in AM relieved with PRN douneb. Sputum sent for culture. Tele- NSR, VSS, afebrile. Voiding.  To

## 2021-08-31 NOTE — PROGRESS NOTES
08/31/21 1030   Mobility   O2 walk?  Yes   SPO2% on Room Air at Rest 93   SPO2% Ambulation on Room Air 91

## 2021-08-31 NOTE — CM/SW NOTE
08/31/21 1500   CM/SW Referral Data   Referral Source Physician   Reason for Referral Discharge planning   Informant EMR  (nursing staff/rounds)   Pertinent Medical Hx   Does patient have an established PCP?  Yes   Patient Info   Patient's Home Environme

## 2021-08-31 NOTE — DISCHARGE SUMMARY
[facility] HOSPITALIST  DISCHARGE SUMMARY     Ky Knott Patient Status:  Inpatient    1965 MRN [id number]   [facility] 5NW-A Attending No att. providers found   Hosp Day # 1 PCP Sebastian Frank DO     Date of Admission: 2021  Da Tabs  Commonly known as: ZITHROMAX      Take 1 tablet (500 mg total) by mouth daily. Quantity: 1 tablet  Refills: 0     cephalexin 500 MG Caps  Commonly known as: KEFLEX      Take 1 capsule (500 mg total) by mouth 4 (four) times daily for 5 days.    Stop MG Tabs      Take 1 tablet (20 mg total) by mouth daily as needed for Erectile Dysfunction.    Quantity: 20 tablet  Refills: 5     tamsulosin 0.4 MG Caps  Commonly known as: FLOMAX      TAKE 1 CAPSULE BY MOUTH EVERY DAY   Quantity: 3090 capsule  Refills: 1 1.       Close out all other open apps (could be competing for audio resources)  2. Disable Bluetooth  3.       Reboot mobile device before joining the video  4.        Come off Wi-Fi and switch over to Data    Please see our Video Visit Tip Sheet if

## 2021-09-01 ENCOUNTER — PATIENT OUTREACH (OUTPATIENT)
Dept: CASE MANAGEMENT | Age: 56
End: 2021-09-01

## 2021-09-01 ENCOUNTER — TELEMEDICINE (OUTPATIENT)
Dept: INTERNAL MEDICINE CLINIC | Facility: CLINIC | Age: 56
End: 2021-09-01

## 2021-09-01 DIAGNOSIS — J18.9 COMMUNITY ACQUIRED PNEUMONIA OF RIGHT LUNG, UNSPECIFIED PART OF LUNG: Primary | ICD-10-CM

## 2021-09-01 DIAGNOSIS — Z02.9 ENCOUNTERS FOR ADMINISTRATIVE PURPOSE: ICD-10-CM

## 2021-09-01 DIAGNOSIS — F17.290 VAPING NICOTINE DEPENDENCE, TOBACCO PRODUCT: ICD-10-CM

## 2021-09-01 DIAGNOSIS — N17.9 AKI (ACUTE KIDNEY INJURY) (HCC): ICD-10-CM

## 2021-09-01 DIAGNOSIS — I95.9 HYPOTENSION, UNSPECIFIED HYPOTENSION TYPE: ICD-10-CM

## 2021-09-01 PROCEDURE — 99203 OFFICE O/P NEW LOW 30 MIN: CPT | Performed by: NURSE PRACTITIONER

## 2021-09-01 RX ORDER — TAMSULOSIN HYDROCHLORIDE 0.4 MG/1
0.4 CAPSULE ORAL DAILY
COMMUNITY

## 2021-09-01 RX ORDER — PHENTERMINE HYDROCHLORIDE 37.5 MG/1
TABLET ORAL
COMMUNITY
End: 2021-09-02

## 2021-09-01 RX ORDER — AMLODIPINE BESYLATE 2.5 MG/1
2.5 TABLET ORAL DAILY
COMMUNITY
End: 2021-09-17

## 2021-09-01 RX ORDER — CALCIUM CARBONATE/VITAMIN D3 600 MG-10
1 TABLET ORAL DAILY
COMMUNITY

## 2021-09-01 RX ORDER — ESCITALOPRAM OXALATE 20 MG/1
20 TABLET ORAL DAILY
COMMUNITY
End: 2021-09-24

## 2021-09-01 NOTE — PROGRESS NOTES
TRANSITIONAL CARE CLINIC PHARMACIST MEDICATION RECONCILIATION        Isidro Flores MRN [id number]    1965 PCP Anders Ureña DO       Comments: Medication history completed by the [address] [place] [address] Pharmacist with the patient and wife zeeshan Soln Inhale 1 puff into the lungs every 4 (four) hours as needed for Wheezing. • Tadalafil 20 MG Oral Tab Take 1 tablet (20 mg total) by mouth daily as needed for Erectile Dysfunction.    • Lisinopril-hydroCHLOROthiazide 20-12.5 MG Oral Tab Take 1 tablet

## 2021-09-02 ENCOUNTER — OFFICE VISIT (OUTPATIENT)
Dept: FAMILY MEDICINE CLINIC | Facility: CLINIC | Age: 56
End: 2021-09-02
Payer: COMMERCIAL

## 2021-09-02 VITALS
OXYGEN SATURATION: 98 % | DIASTOLIC BLOOD PRESSURE: 74 MMHG | HEIGHT: 67 IN | SYSTOLIC BLOOD PRESSURE: 112 MMHG | WEIGHT: 273 LBS | RESPIRATION RATE: 20 BRPM | TEMPERATURE: 98 F | HEART RATE: 113 BPM | BODY MASS INDEX: 42.85 KG/M2

## 2021-09-02 DIAGNOSIS — R94.31 ABNORMAL EKG: ICD-10-CM

## 2021-09-02 DIAGNOSIS — G47.33 OSA (OBSTRUCTIVE SLEEP APNEA): ICD-10-CM

## 2021-09-02 DIAGNOSIS — L30.9 DERMATITIS: ICD-10-CM

## 2021-09-02 DIAGNOSIS — J18.9 PNEUMONIA OF RIGHT LOWER LOBE DUE TO INFECTIOUS ORGANISM: Primary | ICD-10-CM

## 2021-09-02 PROCEDURE — 99214 OFFICE O/P EST MOD 30 MIN: CPT | Performed by: FAMILY MEDICINE

## 2021-09-02 PROCEDURE — 3074F SYST BP LT 130 MM HG: CPT | Performed by: FAMILY MEDICINE

## 2021-09-02 PROCEDURE — 3078F DIAST BP <80 MM HG: CPT | Performed by: FAMILY MEDICINE

## 2021-09-02 PROCEDURE — 3008F BODY MASS INDEX DOCD: CPT | Performed by: FAMILY MEDICINE

## 2021-09-03 PROBLEM — F17.290 VAPING NICOTINE DEPENDENCE, TOBACCO PRODUCT: Status: ACTIVE | Noted: 2021-09-03

## 2021-09-03 NOTE — PROGRESS NOTES
HPI/Subjective:   Patient ID: Teddy Mathur is a 60year old male. Pt with recent RLL pneumonia. Hypoxia. Recent hospital stay. Improved with abx. Since then has had itching on back. Had EKG which showed possible inferior infarct.   Troponin w • Pravastatin Sodium 20 MG Oral Tab Take 1 tablet (20 mg total) by mouth nightly. 90 tablet 2   • triamcinolone acetonide 0.1 % External Cream Apply topically 2 (two) times daily as needed. For one 1 week on then 1 week on. Restart this cycle as needed. acetonide 0.1 % External Cream 60 g 1     Sig: Apply topically 2 (two) times daily as needed. Up to 1-2 weeks only.        Imaging & Referrals:  PULMONARY - INTERNAL  EKG 12-LEAD  XR CHEST PA + LAT CHEST (CPT=71046)

## 2021-09-03 NOTE — PROGRESS NOTES
Video Visit  [address]      Please note that the following visit was completed using two-way, real-time interactive audio and video communication.   This has been done in good cristin to provide continuity of care in the best uncomplicated. Maggi Khan was discharge with Zithromax, Keflex, Duo nebs and a referral to the [facility] for continued follow up. Today, patient is being seen via telemedicine visit for hospital follow-up. He is accompanied by his wife Hilario Stewart.  Belle ALPRAZolam 0.5 MG Oral Tablet Dispersible, Take 1 tablet (0.5 mg total) by mouth 2 (two) times daily as needed for Anxiety. , Disp: 24 tablet, Rfl: 1  gabapentin 100 MG Oral Cap, Take 1 capsule (100 mg total) by mouth 3 (three) times daily. 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    Social History    Tobacco Use 58 08/31/2021 06:41 AM    AST 37 08/31/2021 06:41 AM    INR 1.12 (H) 08/30/2021 07:08 AM    A1C 5.6 10/29/2020 10:23 AM       Immunization History   Administered Date(s) Administered   • Covid-19 Vaccine Pfizer 30 mcg/0.3 ml 07/02/2021, 07/23/2021   • FLUL daily  · Monitor for hypo/hypertension  · Continue all BP medications:  · Amlodipine 5mg daily  · Lisinopril/HCTZ 20/12.5mg daily    4.  BEV (acute kidney injury) ([facility] [address]  · Resolved in hospital  · BUN at discharge was 37 and creatinine 1.23  · Continue to mo daily., Disp: 1 tablet, Rfl: 0  OMEPRAZOLE 40 MG Oral Capsule Delayed Release, TAKE 1 CAPSULE BY MOUTH EVERY DAY, Disp: 90 capsule, Rfl: 0  ibuprofen 200 MG Oral Tab, Take 400 mg by mouth daily.   , Disp: , Rfl:   ALPRAZolam 0.5 MG Oral Tablet Dispersible, follow-up on diagnostic tests and need for follow-up on treatments  ? specialists already aware of assumption/resumption of care  ? Education given to patient and family on self-management, independent living, and activities of daily living. ?  Referrals a

## 2021-09-06 ENCOUNTER — TELEPHONE (OUTPATIENT)
Dept: FAMILY MEDICINE CLINIC | Facility: CLINIC | Age: 56
End: 2021-09-06

## 2021-09-06 NOTE — TELEPHONE ENCOUNTER
Biju Moreno calls in with a low blood pressure, has a tendency to drink alcohol when he comes home. States 3-4 beers, though someone yelled 'not true' in the background. Takes lisinopril-hydrochlorothiazide and amlodipine.  BP had been steady 120s/70s for a wh

## 2021-09-07 RX ORDER — ALBUTEROL SULFATE 90 UG/1
AEROSOL, METERED RESPIRATORY (INHALATION)
Qty: 1 EACH | Refills: 1 | Status: SHIPPED | OUTPATIENT
Start: 2021-09-07 | End: 2021-12-06

## 2021-09-12 ENCOUNTER — PATIENT MESSAGE (OUTPATIENT)
Dept: FAMILY MEDICINE CLINIC | Facility: CLINIC | Age: 56
End: 2021-09-12

## 2021-09-13 RX ORDER — TADALAFIL 20 MG/1
20 TABLET ORAL
Qty: 20 TABLET | Refills: 5 | OUTPATIENT
Start: 2021-09-13

## 2021-09-14 RX ORDER — ALPRAZOLAM 0.5 MG/1
0.5 TABLET ORAL 2 TIMES DAILY PRN
Qty: 24 TABLET | Refills: 0 | Status: SHIPPED | OUTPATIENT
Start: 2021-09-14

## 2021-09-14 RX ORDER — TADALAFIL 20 MG/1
TABLET ORAL
Qty: 20 TABLET | Refills: 0 | Status: SHIPPED | OUTPATIENT
Start: 2021-09-14 | End: 2021-11-13

## 2021-09-17 RX ORDER — AMLODIPINE BESYLATE 2.5 MG/1
TABLET ORAL
Qty: 90 TABLET | Refills: 0 | Status: SHIPPED | OUTPATIENT
Start: 2021-09-17 | End: 2021-11-29

## 2021-09-24 DIAGNOSIS — I10 ESSENTIAL HYPERTENSION: ICD-10-CM

## 2021-09-24 DIAGNOSIS — F41.9 ANXIETY DISORDER, UNSPECIFIED: ICD-10-CM

## 2021-09-24 RX ORDER — LISINOPRIL AND HYDROCHLOROTHIAZIDE 20; 12.5 MG/1; MG/1
TABLET ORAL
Qty: 90 TABLET | Refills: 1 | Status: SHIPPED | OUTPATIENT
Start: 2021-09-24

## 2021-09-24 RX ORDER — ESCITALOPRAM OXALATE 20 MG/1
TABLET ORAL
Qty: 90 TABLET | Refills: 1 | Status: SHIPPED | OUTPATIENT
Start: 2021-09-24

## 2021-09-24 RX ORDER — PRAVASTATIN SODIUM 20 MG
TABLET ORAL
Qty: 90 TABLET | Refills: 2 | Status: SHIPPED | OUTPATIENT
Start: 2021-09-24

## 2021-11-11 ENCOUNTER — TELEPHONE (OUTPATIENT)
Dept: FAMILY MEDICINE CLINIC | Facility: CLINIC | Age: 56
End: 2021-11-11

## 2021-11-13 RX ORDER — SILDENAFIL 50 MG/1
TABLET, FILM COATED ORAL
Qty: 16 TABLET | Refills: 5 | Status: SHIPPED | OUTPATIENT
Start: 2021-11-13

## 2021-11-14 NOTE — TELEPHONE ENCOUNTER
Please check to see if pt is taking cialis or viagra. And make sure pharmacy is not giving him both.

## 2021-11-15 NOTE — TELEPHONE ENCOUNTER
Spoke to pt he states he is only taking Sildenafil. I also called the pharmacy [place] and they will cancel his Tadalafil script that was on file.

## 2021-11-16 DIAGNOSIS — M17.10 OSTEOARTHROSIS, LOCALIZED, PRIMARY, KNEE: ICD-10-CM

## 2021-11-16 DIAGNOSIS — M25.561 KNEE PAIN, BILATERAL: ICD-10-CM

## 2021-11-16 DIAGNOSIS — M25.562 KNEE PAIN, BILATERAL: ICD-10-CM

## 2021-11-18 ENCOUNTER — TELEMEDICINE (OUTPATIENT)
Dept: FAMILY MEDICINE CLINIC | Facility: CLINIC | Age: 56
End: 2021-11-18

## 2021-11-18 DIAGNOSIS — R09.81 SINUS CONGESTION: ICD-10-CM

## 2021-11-18 DIAGNOSIS — J02.9 SORE THROAT: ICD-10-CM

## 2021-11-18 DIAGNOSIS — M25.552 LEFT HIP PAIN: Primary | ICD-10-CM

## 2021-11-18 DIAGNOSIS — M25.562 CHRONIC PAIN OF LEFT KNEE: ICD-10-CM

## 2021-11-18 DIAGNOSIS — G89.29 CHRONIC PAIN OF LEFT KNEE: ICD-10-CM

## 2021-11-18 PROCEDURE — 99214 OFFICE O/P EST MOD 30 MIN: CPT | Performed by: FAMILY MEDICINE

## 2021-11-18 RX ORDER — BENZONATATE 100 MG/1
CAPSULE ORAL 3 TIMES DAILY PRN
Qty: 40 CAPSULE | Refills: 0 | Status: SHIPPED | OUTPATIENT
Start: 2021-11-18

## 2021-11-18 RX ORDER — FLUTICASONE PROPIONATE 50 MCG
2 SPRAY, SUSPENSION (ML) NASAL DAILY
Qty: 2 EACH | Refills: 0 | Status: SHIPPED | OUTPATIENT
Start: 2021-11-18

## 2021-11-18 NOTE — PROGRESS NOTES
Subjective:   Patient ID: Andrea Dove is a 60year old male. Rhinorrhea, sinus pressure, sore throat starting Tuesday night. Headache, fatigue. No ear pressure, no ear pain. + cough. Using cough drops giving mild temp relief.   Took one tablet daily as needed for Anxiety. 24 tablet 1   • gabapentin 100 MG Oral Cap Take 1 capsule (100 mg total) by mouth 3 (three) times daily. 90 capsule 2   • triamcinolone acetonide 0.1 % External Cream Apply topically 2 (two) times daily as needed.  For one 1 david

## 2021-11-19 DIAGNOSIS — M17.10 OSTEOARTHROSIS, LOCALIZED, PRIMARY, KNEE: ICD-10-CM

## 2021-11-19 DIAGNOSIS — M25.562 KNEE PAIN, BILATERAL: ICD-10-CM

## 2021-11-19 DIAGNOSIS — M25.561 KNEE PAIN, BILATERAL: ICD-10-CM

## 2021-11-20 RX ORDER — OMEPRAZOLE 40 MG/1
40 CAPSULE, DELAYED RELEASE ORAL DAILY
Qty: 90 CAPSULE | Refills: 0 | OUTPATIENT
Start: 2021-11-20

## 2021-11-20 RX ORDER — OMEPRAZOLE 40 MG/1
CAPSULE, DELAYED RELEASE ORAL
Qty: 90 CAPSULE | Refills: 0 | Status: SHIPPED | OUTPATIENT
Start: 2021-11-20

## 2021-11-29 DIAGNOSIS — I10 ESSENTIAL (PRIMARY) HYPERTENSION: ICD-10-CM

## 2021-11-29 RX ORDER — AMLODIPINE BESYLATE 2.5 MG/1
TABLET ORAL
Qty: 90 TABLET | Refills: 0 | Status: SHIPPED | OUTPATIENT
Start: 2021-11-29

## 2021-12-06 RX ORDER — ALBUTEROL SULFATE 90 UG/1
AEROSOL, METERED RESPIRATORY (INHALATION)
Qty: 8.5 EACH | Refills: 1 | Status: SHIPPED | OUTPATIENT
Start: 2021-12-06

## 2021-12-11 ENCOUNTER — HOSPITAL ENCOUNTER (OUTPATIENT)
Dept: GENERAL RADIOLOGY | Facility: HOSPITAL | Age: 56
Discharge: HOME | End: 2021-12-11
Attending: FAMILY MEDICINE
Payer: COMMERCIAL

## 2021-12-11 DIAGNOSIS — M25.562 CHRONIC PAIN OF LEFT KNEE: ICD-10-CM

## 2021-12-11 DIAGNOSIS — M25.552 LEFT HIP PAIN: ICD-10-CM

## 2021-12-11 DIAGNOSIS — G89.29 CHRONIC PAIN OF LEFT KNEE: ICD-10-CM

## 2021-12-11 PROCEDURE — 73502 X-RAY EXAM HIP UNI 2-3 VIEWS: CPT | Performed by: FAMILY MEDICINE

## 2021-12-12 DIAGNOSIS — F32.A DEPRESSION, UNSPECIFIED DEPRESSION TYPE: ICD-10-CM

## 2021-12-12 DIAGNOSIS — F41.9 ANXIETY: ICD-10-CM

## 2021-12-12 DIAGNOSIS — I10 ESSENTIAL HYPERTENSION: ICD-10-CM

## 2021-12-13 NOTE — TELEPHONE ENCOUNTER
Last ov 11/18/2021 telemedicine    Last refill alprazolam 9/14/2021      Phentermine not on active list of meds

## 2021-12-14 ENCOUNTER — PATIENT MESSAGE (OUTPATIENT)
Dept: FAMILY MEDICINE CLINIC | Facility: CLINIC | Age: 56
End: 2021-12-14

## 2021-12-14 DIAGNOSIS — I10 ESSENTIAL HYPERTENSION: ICD-10-CM

## 2021-12-14 RX ORDER — ALPRAZOLAM 0.5 MG/1
0.5 TABLET, ORALLY DISINTEGRATING ORAL 2 TIMES DAILY PRN
Qty: 24 TABLET | Refills: 1 | Status: SHIPPED | OUTPATIENT
Start: 2021-12-14 | End: 2022-02-23

## 2021-12-14 RX ORDER — PHENTERMINE HYDROCHLORIDE 37.5 MG/1
TABLET ORAL
Qty: 30 TABLET | Refills: 2 | OUTPATIENT
Start: 2021-12-14

## 2021-12-16 NOTE — TELEPHONE ENCOUNTER
From: Sophia Gould [facility]  To: Tanvir Acevedo DO  Sent: 12/14/2021 6:31 PM CST  Subject: Phentermine 37.5 MG    The Pharmacy says you did not authorize a refill?

## 2021-12-19 RX ORDER — PHENTERMINE HYDROCHLORIDE 37.5 MG/1
37.5 TABLET ORAL
Qty: 30 TABLET | Refills: 2 | Status: SHIPPED | OUTPATIENT
Start: 2021-12-19

## 2022-02-22 RX ORDER — AMLODIPINE BESYLATE 2.5 MG/1
TABLET ORAL
Qty: 90 TABLET | Refills: 0 | Status: SHIPPED | OUTPATIENT
Start: 2022-02-22 | End: 2022-03-31

## 2022-02-23 RX ORDER — OMEPRAZOLE 40 MG/1
CAPSULE, DELAYED RELEASE ORAL
Qty: 90 CAPSULE | Refills: 0 | Status: SHIPPED | OUTPATIENT
Start: 2022-02-23

## 2022-02-23 RX ORDER — ALPRAZOLAM 0.5 MG/1
TABLET, ORALLY DISINTEGRATING ORAL
Qty: 24 TABLET | Refills: 1 | Status: SHIPPED | OUTPATIENT
Start: 2022-02-23 | End: 2022-03-28

## 2022-03-22 ENCOUNTER — PATIENT MESSAGE (OUTPATIENT)
Dept: FAMILY MEDICINE CLINIC | Facility: CLINIC | Age: 57
End: 2022-03-22

## 2022-03-22 RX ORDER — ESCITALOPRAM OXALATE 20 MG/1
TABLET ORAL
Qty: 90 TABLET | Refills: 1 | Status: SHIPPED | OUTPATIENT
Start: 2022-03-22

## 2022-03-22 RX ORDER — LISINOPRIL AND HYDROCHLOROTHIAZIDE 20; 12.5 MG/1; MG/1
TABLET ORAL
Qty: 90 TABLET | Refills: 1 | Status: SHIPPED | OUTPATIENT
Start: 2022-03-22 | End: 2022-03-28

## 2022-03-29 RX ORDER — LISINOPRIL AND HYDROCHLOROTHIAZIDE 20; 12.5 MG/1; MG/1
1 TABLET ORAL DAILY
Qty: 90 TABLET | Refills: 1 | Status: SHIPPED | OUTPATIENT
Start: 2022-03-29

## 2022-03-29 NOTE — TELEPHONE ENCOUNTER
Last ovl 9/2/2021     Next ov 3/31/2022      Last refill albuterol 12/6/2021  Last refill gabapentin 6/24/2021

## 2022-03-31 ENCOUNTER — OFFICE VISIT (OUTPATIENT)
Dept: FAMILY MEDICINE CLINIC | Facility: CLINIC | Age: 57
End: 2022-03-31
Payer: COMMERCIAL

## 2022-03-31 VITALS
HEIGHT: 67 IN | TEMPERATURE: 98 F | HEART RATE: 97 BPM | SYSTOLIC BLOOD PRESSURE: 142 MMHG | OXYGEN SATURATION: 98 % | DIASTOLIC BLOOD PRESSURE: 70 MMHG | WEIGHT: 289 LBS | RESPIRATION RATE: 20 BRPM | BODY MASS INDEX: 45.36 KG/M2

## 2022-03-31 DIAGNOSIS — Z23 NEED FOR SHINGLES VACCINE: ICD-10-CM

## 2022-03-31 DIAGNOSIS — Z12.11 COLON CANCER SCREENING: ICD-10-CM

## 2022-03-31 DIAGNOSIS — E53.8 B12 DEFICIENCY: ICD-10-CM

## 2022-03-31 DIAGNOSIS — Z12.2 SCREENING FOR LUNG CANCER: ICD-10-CM

## 2022-03-31 DIAGNOSIS — I10 PRIMARY HYPERTENSION: ICD-10-CM

## 2022-03-31 DIAGNOSIS — E66.01 CLASS 3 SEVERE OBESITY WITH SERIOUS COMORBIDITY AND BODY MASS INDEX (BMI) OF 45.0 TO 49.9 IN ADULT, UNSPECIFIED OBESITY TYPE (HCC): ICD-10-CM

## 2022-03-31 DIAGNOSIS — Z00.00 ANNUAL PHYSICAL EXAM: Primary | ICD-10-CM

## 2022-03-31 DIAGNOSIS — T88.7XXA MEDICATION SIDE EFFECT: ICD-10-CM

## 2022-03-31 DIAGNOSIS — Z87.891 SMOKING HX: ICD-10-CM

## 2022-03-31 PROCEDURE — 3008F BODY MASS INDEX DOCD: CPT | Performed by: FAMILY MEDICINE

## 2022-03-31 PROCEDURE — 90472 IMMUNIZATION ADMIN EACH ADD: CPT | Performed by: FAMILY MEDICINE

## 2022-03-31 PROCEDURE — 99396 PREV VISIT EST AGE 40-64: CPT | Performed by: FAMILY MEDICINE

## 2022-03-31 PROCEDURE — 3077F SYST BP >= 140 MM HG: CPT | Performed by: FAMILY MEDICINE

## 2022-03-31 PROCEDURE — 3078F DIAST BP <80 MM HG: CPT | Performed by: FAMILY MEDICINE

## 2022-03-31 PROCEDURE — 90750 HZV VACC RECOMBINANT IM: CPT | Performed by: FAMILY MEDICINE

## 2022-03-31 PROCEDURE — 90471 IMMUNIZATION ADMIN: CPT | Performed by: FAMILY MEDICINE

## 2022-03-31 PROCEDURE — 99212 OFFICE O/P EST SF 10 MIN: CPT | Performed by: FAMILY MEDICINE

## 2022-03-31 PROCEDURE — 90732 PPSV23 VACC 2 YRS+ SUBQ/IM: CPT | Performed by: FAMILY MEDICINE

## 2022-03-31 RX ORDER — ALPRAZOLAM 0.5 MG/1
0.5 TABLET, ORALLY DISINTEGRATING ORAL 2 TIMES DAILY PRN
Qty: 24 TABLET | Refills: 1 | Status: SHIPPED | OUTPATIENT
Start: 2022-03-31

## 2022-03-31 RX ORDER — DILTIAZEM HYDROCHLORIDE 120 MG/1
120 CAPSULE, EXTENDED RELEASE ORAL DAILY
Qty: 30 CAPSULE | Refills: 1 | Status: SHIPPED | OUTPATIENT
Start: 2022-03-31

## 2022-03-31 RX ORDER — PHENTERMINE HYDROCHLORIDE 15 MG/1
CAPSULE ORAL
Qty: 30 CAPSULE | Refills: 0 | Status: SHIPPED | OUTPATIENT
Start: 2022-03-31

## 2022-03-31 RX ORDER — PHENTERMINE HYDROCHLORIDE 37.5 MG/1
37.5 TABLET ORAL
Qty: 30 TABLET | Refills: 1 | Status: SHIPPED | OUTPATIENT
Start: 2022-03-31

## 2022-04-01 RX ORDER — ALBUTEROL SULFATE 90 UG/1
1 AEROSOL, METERED RESPIRATORY (INHALATION) EVERY 4 HOURS PRN
Qty: 8.5 EACH | Refills: 1 | Status: SHIPPED | OUTPATIENT
Start: 2022-04-01

## 2022-04-01 RX ORDER — GABAPENTIN 100 MG/1
100 CAPSULE ORAL 3 TIMES DAILY
Qty: 90 CAPSULE | Refills: 2 | Status: SHIPPED | OUTPATIENT
Start: 2022-04-01

## 2022-04-01 RX ORDER — SILDENAFIL 50 MG/1
TABLET, FILM COATED ORAL
Qty: 16 TABLET | Refills: 5 | Status: SHIPPED | OUTPATIENT
Start: 2022-04-01 | End: 2022-09-30

## 2022-04-27 RX ORDER — MELOXICAM 15 MG/1
TABLET ORAL
Qty: 30 TABLET | Refills: 0 | Status: SHIPPED | OUTPATIENT
Start: 2022-05-04 | End: 2022-06-01

## 2022-05-20 DIAGNOSIS — I10 ESSENTIAL (PRIMARY) HYPERTENSION: ICD-10-CM

## 2022-05-20 RX ORDER — AMLODIPINE BESYLATE 2.5 MG/1
TABLET ORAL
Qty: 90 TABLET | Refills: 0 | OUTPATIENT
Start: 2022-05-20

## 2022-05-21 ENCOUNTER — TELEPHONE (OUTPATIENT)
Dept: FAMILY MEDICINE CLINIC | Facility: CLINIC | Age: 57
End: 2022-05-21

## 2022-05-21 DIAGNOSIS — M25.562 KNEE PAIN, BILATERAL: ICD-10-CM

## 2022-05-21 DIAGNOSIS — M17.10 OSTEOARTHROSIS, LOCALIZED, PRIMARY, KNEE: ICD-10-CM

## 2022-05-21 DIAGNOSIS — M25.561 KNEE PAIN, BILATERAL: ICD-10-CM

## 2022-05-24 RX ORDER — OMEPRAZOLE 40 MG/1
CAPSULE, DELAYED RELEASE ORAL
Qty: 90 CAPSULE | Refills: 0 | Status: SHIPPED | OUTPATIENT
Start: 2022-05-24 | End: 2022-05-24 | Stop reason: DRUGHIGH

## 2022-05-25 RX ORDER — NICOTINE POLACRILEX 4 MG/1
1 GUM, CHEWING ORAL DAILY
Qty: 90 TABLET | Refills: 1 | Status: SHIPPED | OUTPATIENT
Start: 2022-05-25

## 2022-05-29 DIAGNOSIS — I10 PRIMARY HYPERTENSION: ICD-10-CM

## 2022-06-01 RX ORDER — DILTIAZEM HYDROCHLORIDE 120 MG/1
CAPSULE, EXTENDED RELEASE ORAL
Qty: 30 CAPSULE | Refills: 1 | Status: SHIPPED | OUTPATIENT
Start: 2022-06-01

## 2022-06-01 RX ORDER — MELOXICAM 15 MG/1
TABLET ORAL
Qty: 10 TABLET | Refills: 0 | Status: SHIPPED | OUTPATIENT
Start: 2022-06-01

## 2022-06-19 DIAGNOSIS — E66.01 CLASS 3 SEVERE OBESITY WITH SERIOUS COMORBIDITY AND BODY MASS INDEX (BMI) OF 45.0 TO 49.9 IN ADULT, UNSPECIFIED OBESITY TYPE (HCC): ICD-10-CM

## 2022-06-20 RX ORDER — MELOXICAM 15 MG/1
TABLET ORAL
Qty: 10 TABLET | Refills: 0 | Status: SHIPPED | OUTPATIENT
Start: 2022-06-20 | End: 2022-06-30

## 2022-06-20 RX ORDER — PHENTERMINE HYDROCHLORIDE 37.5 MG/1
37.5 TABLET ORAL
Qty: 30 TABLET | Refills: 0 | Status: SHIPPED | OUTPATIENT
Start: 2022-06-20 | End: 2022-06-30

## 2022-06-20 NOTE — TELEPHONE ENCOUNTER
Last ov 3/31/2022    Next ov 6/30/2022    Last refill meloxicam 61/2022  Last refill phentermine 3/31/2022

## 2022-06-24 RX ORDER — PRAVASTATIN SODIUM 20 MG
TABLET ORAL
Qty: 90 TABLET | Refills: 0 | Status: SHIPPED | OUTPATIENT
Start: 2022-06-24

## 2022-06-30 ENCOUNTER — LAB ENCOUNTER (OUTPATIENT)
Dept: LAB | Age: 57
End: 2022-06-30
Attending: FAMILY MEDICINE
Payer: COMMERCIAL

## 2022-06-30 ENCOUNTER — OFFICE VISIT (OUTPATIENT)
Dept: FAMILY MEDICINE CLINIC | Facility: CLINIC | Age: 57
End: 2022-06-30
Payer: COMMERCIAL

## 2022-06-30 VITALS
SYSTOLIC BLOOD PRESSURE: 118 MMHG | OXYGEN SATURATION: 98 % | BODY MASS INDEX: 48.34 KG/M2 | TEMPERATURE: 98 F | HEIGHT: 67 IN | WEIGHT: 308 LBS | DIASTOLIC BLOOD PRESSURE: 74 MMHG | RESPIRATION RATE: 22 BRPM | HEART RATE: 101 BPM

## 2022-06-30 DIAGNOSIS — N18.31 STAGE 3A CHRONIC KIDNEY DISEASE (HCC): ICD-10-CM

## 2022-06-30 DIAGNOSIS — R73.09 ELEVATED GLUCOSE: ICD-10-CM

## 2022-06-30 DIAGNOSIS — R06.02 SOB (SHORTNESS OF BREATH) ON EXERTION: ICD-10-CM

## 2022-06-30 DIAGNOSIS — E66.01 CLASS 3 SEVERE OBESITY WITH SERIOUS COMORBIDITY AND BODY MASS INDEX (BMI) OF 45.0 TO 49.9 IN ADULT, UNSPECIFIED OBESITY TYPE (HCC): Primary | ICD-10-CM

## 2022-06-30 DIAGNOSIS — I10 PRIMARY HYPERTENSION: ICD-10-CM

## 2022-06-30 DIAGNOSIS — J44.9 CHRONIC OBSTRUCTIVE PULMONARY DISEASE, UNSPECIFIED COPD TYPE (HCC): ICD-10-CM

## 2022-06-30 LAB
ANION GAP SERPL CALC-SCNC: 8 MMOL/L (ref 0–18)
BUN BLD-MCNC: 28 MG/DL (ref 7–18)
CALCIUM BLD-MCNC: 9.5 MG/DL (ref 8.5–10.1)
CHLORIDE SERPL-SCNC: 99 MMOL/L (ref 98–112)
CO2 SERPL-SCNC: 26 MMOL/L (ref 21–32)
CREAT BLD-MCNC: 1.73 MG/DL
EST. AVERAGE GLUCOSE BLD GHB EST-MCNC: 126 MG/DL (ref 68–126)
FASTING STATUS PATIENT QL REPORTED: NO
GLUCOSE BLD-MCNC: 135 MG/DL (ref 70–99)
HBA1C MFR BLD: 6 % (ref ?–5.7)
OSMOLALITY SERPL CALC.SUM OF ELEC: 284 MOSM/KG (ref 275–295)
POTASSIUM SERPL-SCNC: 4.5 MMOL/L (ref 3.5–5.1)
SODIUM SERPL-SCNC: 133 MMOL/L (ref 136–145)

## 2022-06-30 PROCEDURE — 3078F DIAST BP <80 MM HG: CPT | Performed by: FAMILY MEDICINE

## 2022-06-30 PROCEDURE — 99214 OFFICE O/P EST MOD 30 MIN: CPT | Performed by: FAMILY MEDICINE

## 2022-06-30 PROCEDURE — 83036 HEMOGLOBIN GLYCOSYLATED A1C: CPT | Performed by: FAMILY MEDICINE

## 2022-06-30 PROCEDURE — 3008F BODY MASS INDEX DOCD: CPT | Performed by: FAMILY MEDICINE

## 2022-06-30 PROCEDURE — 3074F SYST BP LT 130 MM HG: CPT | Performed by: FAMILY MEDICINE

## 2022-06-30 PROCEDURE — 80048 BASIC METABOLIC PNL TOTAL CA: CPT | Performed by: FAMILY MEDICINE

## 2022-06-30 RX ORDER — DILTIAZEM HYDROCHLORIDE 120 MG/1
120 CAPSULE, EXTENDED RELEASE ORAL DAILY
Qty: 90 CAPSULE | Refills: 1 | Status: SHIPPED | OUTPATIENT
Start: 2022-06-30

## 2022-06-30 RX ORDER — MELOXICAM 15 MG/1
15 TABLET ORAL DAILY PRN
Qty: 20 TABLET | Refills: 0 | Status: SHIPPED | OUTPATIENT
Start: 2022-06-30

## 2022-06-30 RX ORDER — FLUTICASONE PROPIONATE 50 MCG
2 SPRAY, SUSPENSION (ML) NASAL DAILY
Qty: 1 EACH | Refills: 11 | Status: SHIPPED | OUTPATIENT
Start: 2022-06-30

## 2022-06-30 RX ORDER — TAMSULOSIN HYDROCHLORIDE 0.4 MG/1
0.4 CAPSULE ORAL DAILY
Qty: 90 CAPSULE | Refills: 5 | Status: SHIPPED | OUTPATIENT
Start: 2022-06-30

## 2022-06-30 RX ORDER — OMEPRAZOLE 40 MG/1
40 CAPSULE, DELAYED RELEASE ORAL DAILY
Qty: 90 CAPSULE | Refills: 3 | Status: SHIPPED | OUTPATIENT
Start: 2022-06-30 | End: 2023-06-25

## 2022-06-30 RX ORDER — BUDESONIDE AND FORMOTEROL FUMARATE DIHYDRATE 160; 4.5 UG/1; UG/1
2 AEROSOL RESPIRATORY (INHALATION) 2 TIMES DAILY
Qty: 1 EACH | Refills: 5 | Status: SHIPPED | OUTPATIENT
Start: 2022-06-30

## 2022-06-30 RX ORDER — PHENTERMINE HYDROCHLORIDE 37.5 MG/1
37.5 TABLET ORAL
Qty: 30 TABLET | Refills: 3 | Status: SHIPPED | OUTPATIENT
Start: 2022-07-20

## 2022-07-07 DIAGNOSIS — E66.01 CLASS 3 SEVERE OBESITY WITH SERIOUS COMORBIDITY AND BODY MASS INDEX (BMI) OF 45.0 TO 49.9 IN ADULT, UNSPECIFIED OBESITY TYPE (HCC): ICD-10-CM

## 2022-07-07 NOTE — TELEPHONE ENCOUNTER
Patient comment: Hananh Jaffe is backed ordered at my pharmacy. Can Ozempic work as a alternate?      LV:6/30/2022  LR:6/30/2022

## 2022-07-19 RX ORDER — MELOXICAM 15 MG/1
TABLET ORAL
Qty: 20 TABLET | Refills: 0 | Status: SHIPPED | OUTPATIENT
Start: 2022-07-19

## 2022-08-01 RX ORDER — ALBUTEROL SULFATE 90 UG/1
AEROSOL, METERED RESPIRATORY (INHALATION)
Qty: 8.5 EACH | Refills: 1 | Status: SHIPPED | OUTPATIENT
Start: 2022-08-01

## 2022-08-03 ENCOUNTER — PATIENT MESSAGE (OUTPATIENT)
Dept: FAMILY MEDICINE CLINIC | Facility: CLINIC | Age: 57
End: 2022-08-03

## 2022-08-05 ENCOUNTER — TELEPHONE (OUTPATIENT)
Dept: FAMILY MEDICINE CLINIC | Facility: CLINIC | Age: 57
End: 2022-08-05

## 2022-08-08 RX ORDER — MELOXICAM 15 MG/1
TABLET ORAL
Qty: 20 TABLET | Refills: 0 | OUTPATIENT
Start: 2022-08-08

## 2022-08-08 NOTE — TELEPHONE ENCOUNTER
Refill too early. Will refill after 1 week. Taking it daily is not good for kidneys. Please let him know.

## 2022-08-16 DIAGNOSIS — F32.A DEPRESSION, UNSPECIFIED DEPRESSION TYPE: ICD-10-CM

## 2022-08-16 DIAGNOSIS — F41.9 ANXIETY: ICD-10-CM

## 2022-08-18 RX ORDER — ALPRAZOLAM 0.5 MG/1
TABLET, ORALLY DISINTEGRATING ORAL
Qty: 24 TABLET | Refills: 1 | Status: SHIPPED | OUTPATIENT
Start: 2022-08-18

## 2022-08-27 ENCOUNTER — HOSPITAL ENCOUNTER (INPATIENT)
Facility: HOSPITAL | Age: 57
LOS: 2 days | Discharge: HOME | DRG: 682 | End: 2022-08-30
Attending: EMERGENCY MEDICINE | Admitting: HOSPITALIST
Payer: COMMERCIAL

## 2022-08-27 ENCOUNTER — APPOINTMENT (OUTPATIENT)
Dept: CT IMAGING | Facility: HOSPITAL | Age: 57
End: 2022-08-27
Attending: EMERGENCY MEDICINE
Payer: COMMERCIAL

## 2022-08-27 ENCOUNTER — HOSPITAL ENCOUNTER (INPATIENT)
Facility: HOSPITAL | Age: 57
LOS: 2 days | Discharge: HOME | End: 2022-08-30
Attending: EMERGENCY MEDICINE | Admitting: HOSPITALIST
Payer: COMMERCIAL

## 2022-08-27 ENCOUNTER — APPOINTMENT (OUTPATIENT)
Dept: CT IMAGING | Facility: HOSPITAL | Age: 57
DRG: 682 | End: 2022-08-27
Attending: EMERGENCY MEDICINE
Payer: COMMERCIAL

## 2022-08-27 DIAGNOSIS — E87.1 HYPONATREMIA: ICD-10-CM

## 2022-08-27 DIAGNOSIS — E83.42 HYPOMAGNESEMIA: ICD-10-CM

## 2022-08-27 DIAGNOSIS — E86.0 DEHYDRATION: ICD-10-CM

## 2022-08-27 DIAGNOSIS — E87.5 HYPERKALEMIA: ICD-10-CM

## 2022-08-27 DIAGNOSIS — N18.9 ACUTE RENAL FAILURE SUPERIMPOSED ON CHRONIC KIDNEY DISEASE, UNSPECIFIED CKD STAGE, UNSPECIFIED ACUTE RENAL FAILURE TYPE (HCC): ICD-10-CM

## 2022-08-27 DIAGNOSIS — N17.9 ACUTE RENAL FAILURE SUPERIMPOSED ON CHRONIC KIDNEY DISEASE, UNSPECIFIED CKD STAGE, UNSPECIFIED ACUTE RENAL FAILURE TYPE (HCC): ICD-10-CM

## 2022-08-27 DIAGNOSIS — I95.9 HYPOTENSION, UNSPECIFIED HYPOTENSION TYPE: Primary | ICD-10-CM

## 2022-08-27 LAB
ALBUMIN SERPL-MCNC: 3.6 G/DL (ref 3.4–5)
ALBUMIN/GLOB SERPL: 0.8 {RATIO} (ref 1–2)
ALP LIVER SERPL-CCNC: 62 U/L
ALT SERPL-CCNC: 64 U/L
ANION GAP SERPL CALC-SCNC: 8 MMOL/L (ref 0–18)
AST SERPL-CCNC: 62 U/L (ref 15–37)
BASOPHILS # BLD AUTO: 0.04 X10(3) UL (ref 0–0.2)
BASOPHILS NFR BLD AUTO: 0.6 %
BILIRUB SERPL-MCNC: 0.4 MG/DL (ref 0.1–2)
BUN BLD-MCNC: 63 MG/DL (ref 7–18)
CALCIUM BLD-MCNC: 9.8 MG/DL (ref 8.5–10.1)
CHLORIDE SERPL-SCNC: 101 MMOL/L (ref 98–112)
CO2 SERPL-SCNC: 18 MMOL/L (ref 21–32)
CREAT BLD-MCNC: 3.15 MG/DL
EOSINOPHIL # BLD AUTO: 0.06 X10(3) UL (ref 0–0.7)
EOSINOPHIL NFR BLD AUTO: 1 %
ERYTHROCYTE [DISTWIDTH] IN BLOOD BY AUTOMATED COUNT: 13.2 %
ETHANOL SERPL-MCNC: 104 MG/DL (ref ?–3)
GFR SERPLBLD BASED ON 1.73 SQ M-ARVRAT: 22 ML/MIN/1.73M2 (ref 60–?)
GLOBULIN PLAS-MCNC: 4.5 G/DL (ref 2.8–4.4)
GLUCOSE BLD-MCNC: 107 MG/DL (ref 70–99)
GLUCOSE BLD-MCNC: 88 MG/DL (ref 70–99)
GLUCOSE BLD-MCNC: 88 MG/DL (ref 70–99)
HCT VFR BLD AUTO: 40.7 %
HGB BLD-MCNC: 13.2 G/DL
IMM GRANULOCYTES # BLD AUTO: 0.03 X10(3) UL (ref 0–1)
IMM GRANULOCYTES NFR BLD: 0.5 %
LYMPHOCYTES # BLD AUTO: 0.99 X10(3) UL (ref 1–4)
LYMPHOCYTES NFR BLD AUTO: 15.7 %
MAGNESIUM SERPL-MCNC: 1.3 MG/DL (ref 1.6–2.6)
MCH RBC QN AUTO: 29.6 PG (ref 26–34)
MCHC RBC AUTO-ENTMCNC: 32.4 G/DL (ref 31–37)
MCV RBC AUTO: 91.3 FL
MONOCYTES # BLD AUTO: 0.62 X10(3) UL (ref 0.1–1)
MONOCYTES NFR BLD AUTO: 9.8 %
NEUTROPHILS # BLD AUTO: 4.56 X10 (3) UL (ref 1.5–7.7)
NEUTROPHILS # BLD AUTO: 4.56 X10(3) UL (ref 1.5–7.7)
NEUTROPHILS NFR BLD AUTO: 72.4 %
OSMOLALITY SERPL CALC.SUM OF ELEC: 281 MOSM/KG (ref 275–295)
PLATELET # BLD AUTO: 186 10(3)UL (ref 150–450)
POTASSIUM SERPL-SCNC: 6 MMOL/L (ref 3.5–5.1)
PROT SERPL-MCNC: 8.1 G/DL (ref 6.4–8.2)
RBC # BLD AUTO: 4.46 X10(6)UL
SARS-COV-2 RNA RESP QL NAA+PROBE: NOT DETECTED
SODIUM SERPL-SCNC: 127 MMOL/L (ref 136–145)
TROPONIN I HIGH SENSITIVITY: 6 NG/L
TROPONIN I HIGH SENSITIVITY: 6 NG/L
WBC # BLD AUTO: 6.3 X10(3) UL (ref 4–11)

## 2022-08-27 PROCEDURE — 70450 CT HEAD/BRAIN W/O DYE: CPT | Performed by: EMERGENCY MEDICINE

## 2022-08-27 RX ORDER — SODIUM POLYSTYRENE SULFONATE 4.1 MEQ/G
15 POWDER, FOR SUSPENSION ORAL; RECTAL ONCE
Status: COMPLETED | OUTPATIENT
Start: 2022-08-27 | End: 2022-08-27

## 2022-08-27 RX ORDER — ALBUTEROL SULFATE 90 UG/1
2 AEROSOL, METERED RESPIRATORY (INHALATION) ONCE
Status: COMPLETED | OUTPATIENT
Start: 2022-08-27 | End: 2022-08-27

## 2022-08-27 RX ORDER — SODIUM CHLORIDE 9 MG/ML
1000 INJECTION, SOLUTION INTRAVENOUS ONCE
Status: COMPLETED | OUTPATIENT
Start: 2022-08-27 | End: 2022-08-27

## 2022-08-27 RX ORDER — MAGNESIUM SULFATE 1 G/100ML
INJECTION INTRAVENOUS
Status: DISCONTINUED
Start: 2022-08-27 | End: 2022-08-27 | Stop reason: WASHOUT

## 2022-08-27 RX ORDER — MAGNESIUM SULFATE 1 G/100ML
1 INJECTION INTRAVENOUS ONCE
Status: COMPLETED | OUTPATIENT
Start: 2022-08-27 | End: 2022-08-27

## 2022-08-27 RX ORDER — DEXTROSE MONOHYDRATE 25 G/50ML
50 INJECTION, SOLUTION INTRAVENOUS ONCE
Status: COMPLETED | OUTPATIENT
Start: 2022-08-27 | End: 2022-08-27

## 2022-08-28 PROBLEM — N17.9 ACUTE RENAL FAILURE SUPERIMPOSED ON CHRONIC KIDNEY DISEASE, UNSPECIFIED CKD STAGE, UNSPECIFIED ACUTE RENAL FAILURE TYPE (HCC): Status: ACTIVE | Noted: 2022-08-28

## 2022-08-28 PROBLEM — E83.42 HYPOMAGNESEMIA: Status: ACTIVE | Noted: 2022-08-28

## 2022-08-28 PROBLEM — N17.9 ACUTE RENAL FAILURE SUPERIMPOSED ON CHRONIC KIDNEY DISEASE, UNSPECIFIED CKD STAGE, UNSPECIFIED ACUTE RENAL FAILURE TYPE: Status: ACTIVE | Noted: 2022-08-28

## 2022-08-28 PROBLEM — E87.5 HYPERKALEMIA: Status: ACTIVE | Noted: 2022-08-28

## 2022-08-28 PROBLEM — N18.9 ACUTE RENAL FAILURE SUPERIMPOSED ON CHRONIC KIDNEY DISEASE, UNSPECIFIED CKD STAGE, UNSPECIFIED ACUTE RENAL FAILURE TYPE (HCC): Status: ACTIVE | Noted: 2022-08-28

## 2022-08-28 PROBLEM — N18.9 ACUTE RENAL FAILURE SUPERIMPOSED ON CHRONIC KIDNEY DISEASE, UNSPECIFIED CKD STAGE, UNSPECIFIED ACUTE RENAL FAILURE TYPE: Status: ACTIVE | Noted: 2022-08-28

## 2022-08-28 PROBLEM — I95.9 HYPOTENSION, UNSPECIFIED HYPOTENSION TYPE: Status: ACTIVE | Noted: 2022-08-28

## 2022-08-28 PROBLEM — E87.1 HYPONATREMIA: Status: ACTIVE | Noted: 2022-08-28

## 2022-08-28 PROBLEM — E86.0 DEHYDRATION: Status: ACTIVE | Noted: 2022-08-28

## 2022-08-28 LAB
ALBUMIN SERPL-MCNC: 2.9 G/DL (ref 3.4–5)
ALBUMIN SERPL-MCNC: 2.9 G/DL (ref 3.4–5)
ALBUMIN/GLOB SERPL: 0.7 {RATIO} (ref 1–2)
ALBUMIN/GLOB SERPL: 0.8 {RATIO} (ref 1–2)
ALP LIVER SERPL-CCNC: 51 U/L
ALP LIVER SERPL-CCNC: 51 U/L
ALT SERPL-CCNC: 50 U/L
ALT SERPL-CCNC: 52 U/L
ANION GAP SERPL CALC-SCNC: 11 MMOL/L (ref 0–18)
ANION GAP SERPL CALC-SCNC: 12 MMOL/L (ref 0–18)
AST SERPL-CCNC: 35 U/L (ref 15–37)
AST SERPL-CCNC: 40 U/L (ref 15–37)
ATRIAL RATE: 87 BPM
BASOPHILS # BLD AUTO: 0.02 X10(3) UL (ref 0–0.2)
BASOPHILS NFR BLD AUTO: 0.4 %
BILIRUB SERPL-MCNC: 0.3 MG/DL (ref 0.1–2)
BILIRUB SERPL-MCNC: 0.3 MG/DL (ref 0.1–2)
BUN BLD-MCNC: 64 MG/DL (ref 7–18)
BUN BLD-MCNC: 65 MG/DL (ref 7–18)
CALCIUM BLD-MCNC: 8.5 MG/DL (ref 8.5–10.1)
CALCIUM BLD-MCNC: 8.7 MG/DL (ref 8.5–10.1)
CHLORIDE SERPL-SCNC: 102 MMOL/L (ref 98–112)
CHLORIDE SERPL-SCNC: 105 MMOL/L (ref 98–112)
CO2 SERPL-SCNC: 20 MMOL/L (ref 21–32)
CO2 SERPL-SCNC: 20 MMOL/L (ref 21–32)
CREAT BLD-MCNC: 3.03 MG/DL
CREAT BLD-MCNC: 3.38 MG/DL
EOSINOPHIL # BLD AUTO: 0.03 X10(3) UL (ref 0–0.7)
EOSINOPHIL NFR BLD AUTO: 0.6 %
ERYTHROCYTE [DISTWIDTH] IN BLOOD BY AUTOMATED COUNT: 13.2 %
GFR SERPLBLD BASED ON 1.73 SQ M-ARVRAT: 20 ML/MIN/1.73M2 (ref 60–?)
GFR SERPLBLD BASED ON 1.73 SQ M-ARVRAT: 23 ML/MIN/1.73M2 (ref 60–?)
GLOBULIN PLAS-MCNC: 3.7 G/DL (ref 2.8–4.4)
GLOBULIN PLAS-MCNC: 3.9 G/DL (ref 2.8–4.4)
GLUCOSE BLD-MCNC: 102 MG/DL (ref 70–99)
GLUCOSE BLD-MCNC: 85 MG/DL (ref 70–99)
GLUCOSE BLD-MCNC: 87 MG/DL (ref 70–99)
GLUCOSE BLD-MCNC: 87 MG/DL (ref 70–99)
GLUCOSE BLD-MCNC: 95 MG/DL (ref 70–99)
GLUCOSE BLD-MCNC: 96 MG/DL (ref 70–99)
HCT VFR BLD AUTO: 35.5 %
HGB BLD-MCNC: 11.5 G/DL
IMM GRANULOCYTES # BLD AUTO: 0.02 X10(3) UL (ref 0–1)
IMM GRANULOCYTES NFR BLD: 0.4 %
LACTATE SERPL-SCNC: 1.6 MMOL/L (ref 0.4–2)
LACTATE SERPL-SCNC: 2.3 MMOL/L (ref 0.4–2)
LYMPHOCYTES # BLD AUTO: 0.65 X10(3) UL (ref 1–4)
LYMPHOCYTES NFR BLD AUTO: 12.6 %
MAGNESIUM SERPL-MCNC: 1.5 MG/DL (ref 1.6–2.6)
MCH RBC QN AUTO: 29.8 PG (ref 26–34)
MCHC RBC AUTO-ENTMCNC: 32.4 G/DL (ref 31–37)
MCV RBC AUTO: 92 FL
MONOCYTES # BLD AUTO: 0.58 X10(3) UL (ref 0.1–1)
MONOCYTES NFR BLD AUTO: 11.3 %
NEUTROPHILS # BLD AUTO: 3.84 X10 (3) UL (ref 1.5–7.7)
NEUTROPHILS # BLD AUTO: 3.84 X10(3) UL (ref 1.5–7.7)
NEUTROPHILS NFR BLD AUTO: 74.7 %
OSMOLALITY SERPL CALC.SUM OF ELEC: 297 MOSM/KG (ref 275–295)
OSMOLALITY SERPL CALC.SUM OF ELEC: 300 MOSM/KG (ref 275–295)
PHOSPHATE SERPL-MCNC: 5 MG/DL (ref 2.5–4.9)
PLATELET # BLD AUTO: 138 10(3)UL (ref 150–450)
POTASSIUM SERPL-SCNC: 4.7 MMOL/L (ref 3.5–5.1)
POTASSIUM SERPL-SCNC: 4.7 MMOL/L (ref 3.5–5.1)
PROCALCITONIN SERPL-MCNC: 0.11 NG/ML (ref ?–0.16)
PROT SERPL-MCNC: 6.6 G/DL (ref 6.4–8.2)
PROT SERPL-MCNC: 6.8 G/DL (ref 6.4–8.2)
Q-T INTERVAL: 220 MS
QRS DURATION: 82 MS
QTC CALCULATION (BEZET): 265 MS
R AXIS: 27 DEGREES
RBC # BLD AUTO: 3.86 X10(6)UL
SODIUM SERPL-SCNC: 134 MMOL/L (ref 136–145)
SODIUM SERPL-SCNC: 136 MMOL/L (ref 136–145)
T AXIS: 191 DEGREES
VENTRICULAR RATE: 87 BPM
WBC # BLD AUTO: 5.1 X10(3) UL (ref 4–11)

## 2022-08-28 PROCEDURE — 99291 CRITICAL CARE FIRST HOUR: CPT | Performed by: NURSE PRACTITIONER

## 2022-08-28 PROCEDURE — 99223 1ST HOSP IP/OBS HIGH 75: CPT | Performed by: HOSPITALIST

## 2022-08-28 RX ORDER — POLYETHYLENE GLYCOL 3350 17 G/17G
17 POWDER, FOR SOLUTION ORAL DAILY PRN
Status: DISCONTINUED | OUTPATIENT
Start: 2022-08-28 | End: 2022-08-30

## 2022-08-28 RX ORDER — SODIUM CHLORIDE 9 MG/ML
INJECTION, SOLUTION INTRAVENOUS CONTINUOUS
Status: DISCONTINUED | OUTPATIENT
Start: 2022-08-28 | End: 2022-08-29

## 2022-08-28 RX ORDER — HYDROCODONE BITARTRATE AND ACETAMINOPHEN 5; 325 MG/1; MG/1
1 TABLET ORAL EVERY 4 HOURS PRN
Status: DISCONTINUED | OUTPATIENT
Start: 2022-08-28 | End: 2022-08-30

## 2022-08-28 RX ORDER — FLUTICASONE PROPIONATE 50 MCG
2 SPRAY, SUSPENSION (ML) NASAL DAILY
Status: DISCONTINUED | OUTPATIENT
Start: 2022-08-28 | End: 2022-08-30

## 2022-08-28 RX ORDER — FLUTICASONE FUROATE AND VILANTEROL 200; 25 UG/1; UG/1
1 POWDER RESPIRATORY (INHALATION) DAILY
Refills: 5 | Status: DISCONTINUED | OUTPATIENT
Start: 2022-08-28 | End: 2022-08-30

## 2022-08-28 RX ORDER — PRAVASTATIN SODIUM 20 MG
20 TABLET ORAL NIGHTLY
Status: DISCONTINUED | OUTPATIENT
Start: 2022-08-28 | End: 2022-08-30

## 2022-08-28 RX ORDER — PROCHLORPERAZINE EDISYLATE 5 MG/ML
5 INJECTION INTRAMUSCULAR; INTRAVENOUS EVERY 8 HOURS PRN
Status: DISCONTINUED | OUTPATIENT
Start: 2022-08-28 | End: 2022-08-30

## 2022-08-28 RX ORDER — ONDANSETRON 2 MG/ML
4 INJECTION INTRAMUSCULAR; INTRAVENOUS EVERY 6 HOURS PRN
Status: DISCONTINUED | OUTPATIENT
Start: 2022-08-28 | End: 2022-08-28

## 2022-08-28 RX ORDER — MELATONIN
3 NIGHTLY PRN
Status: DISCONTINUED | OUTPATIENT
Start: 2022-08-28 | End: 2022-08-30

## 2022-08-28 RX ORDER — ALPRAZOLAM 0.5 MG/1
0.5 TABLET ORAL 2 TIMES DAILY PRN
Status: DISCONTINUED | OUTPATIENT
Start: 2022-08-28 | End: 2022-08-30

## 2022-08-28 RX ORDER — ONDANSETRON 2 MG/ML
4 INJECTION INTRAMUSCULAR; INTRAVENOUS EVERY 6 HOURS PRN
Status: DISCONTINUED | OUTPATIENT
Start: 2022-08-28 | End: 2022-08-30

## 2022-08-28 RX ORDER — HYDROCODONE BITARTRATE AND ACETAMINOPHEN 5; 325 MG/1; MG/1
2 TABLET ORAL EVERY 4 HOURS PRN
Status: DISCONTINUED | OUTPATIENT
Start: 2022-08-28 | End: 2022-08-30

## 2022-08-28 RX ORDER — SENNOSIDES 8.6 MG
17.2 TABLET ORAL NIGHTLY PRN
Status: DISCONTINUED | OUTPATIENT
Start: 2022-08-28 | End: 2022-08-30

## 2022-08-28 RX ORDER — BISACODYL 10 MG
10 SUPPOSITORY, RECTAL RECTAL
Status: DISCONTINUED | OUTPATIENT
Start: 2022-08-28 | End: 2022-08-30

## 2022-08-28 RX ORDER — PANTOPRAZOLE SODIUM 40 MG/1
40 TABLET, DELAYED RELEASE ORAL
Refills: 3 | Status: DISCONTINUED | OUTPATIENT
Start: 2022-08-28 | End: 2022-08-30

## 2022-08-28 RX ORDER — ALBUTEROL SULFATE 90 UG/1
1 AEROSOL, METERED RESPIRATORY (INHALATION) EVERY 4 HOURS PRN
Status: DISCONTINUED | OUTPATIENT
Start: 2022-08-28 | End: 2022-08-30

## 2022-08-28 RX ORDER — ACETAMINOPHEN 325 MG/1
650 TABLET ORAL EVERY 4 HOURS PRN
Status: DISCONTINUED | OUTPATIENT
Start: 2022-08-28 | End: 2022-08-30

## 2022-08-28 RX ORDER — PROCHLORPERAZINE EDISYLATE 5 MG/ML
5 INJECTION INTRAMUSCULAR; INTRAVENOUS EVERY 8 HOURS PRN
Status: DISCONTINUED | OUTPATIENT
Start: 2022-08-28 | End: 2022-08-28

## 2022-08-28 RX ORDER — HEPARIN SODIUM 5000 [USP'U]/ML
7500 INJECTION, SOLUTION INTRAVENOUS; SUBCUTANEOUS EVERY 8 HOURS SCHEDULED
Status: DISCONTINUED | OUTPATIENT
Start: 2022-08-28 | End: 2022-08-30

## 2022-08-28 RX ORDER — GABAPENTIN 100 MG/1
100 CAPSULE ORAL 3 TIMES DAILY
Status: DISCONTINUED | OUTPATIENT
Start: 2022-08-28 | End: 2022-08-30

## 2022-08-28 RX ORDER — ACETAMINOPHEN 500 MG
500 TABLET ORAL EVERY 4 HOURS PRN
Status: DISCONTINUED | OUTPATIENT
Start: 2022-08-28 | End: 2022-08-30

## 2022-08-28 RX ORDER — ENOXAPARIN SODIUM 100 MG/ML
40 INJECTION SUBCUTANEOUS DAILY
Status: DISCONTINUED | OUTPATIENT
Start: 2022-08-28 | End: 2022-08-28

## 2022-08-28 RX ORDER — ESCITALOPRAM OXALATE 20 MG/1
20 TABLET ORAL DAILY
Status: DISCONTINUED | OUTPATIENT
Start: 2022-08-28 | End: 2022-08-30

## 2022-08-28 RX ORDER — IPRATROPIUM BROMIDE AND ALBUTEROL SULFATE 2.5; .5 MG/3ML; MG/3ML
3 SOLUTION RESPIRATORY (INHALATION) EVERY 4 HOURS PRN
Status: DISCONTINUED | OUTPATIENT
Start: 2022-08-28 | End: 2022-08-30

## 2022-08-28 NOTE — PLAN OF CARE
Report received from previous RN. Pt on 3 l NC and A&O x 4. Pt denies any pain. Pt on 7 mcg of levophed gtt and getting another bolus of 1 liter of 0.9. with a goal of SBP > 90 or MAP >65  Labs redrawn. Lungs clear and dm. Hx of ZEB and placed on CPAP with oxygen. Bladder scan and 49 c/c will continue to follow protocol. Call light within reach.

## 2022-08-28 NOTE — PLAN OF CARE
Assume care in am. Patient awake and alert. Weaned off levophed. Vs stable. Diet tolerated. PTOT on consult. Denies pain. Adequate urine output. Continue to monitor patient progress. 1750-Patient stable upon transfer.

## 2022-08-28 NOTE — RESPIRATORY THERAPY NOTE
Patient only used the cpap for a short time ( 48  minutes ) last night. Patient said the nasalaire cpap prongs were uncomfortable. he said someone will bring his mask from home tonight.

## 2022-08-28 NOTE — PROGRESS NOTES
NURSING TRANSFER NOTE      Patient admitted via [address] to room. Safety precautions initiated. Bed in low position. Call light in reach. Updated with plan of care and verbalized understanding.

## 2022-08-28 NOTE — ED INITIAL ASSESSMENT (HPI)
As per patient  He is feeling fatigued  And weak since  Few days . Patient is on keto diet and he lost 23 lb  Weight recently. Loss of appetite for few days checked his blood pressure at home was low. denies chest pain patient is complaining of bilateral lower leg pain

## 2022-08-29 LAB
ALBUMIN SERPL-MCNC: 3 G/DL (ref 3.4–5)
ALBUMIN/GLOB SERPL: 0.7 {RATIO} (ref 1–2)
ALP LIVER SERPL-CCNC: 56 U/L
ALT SERPL-CCNC: 48 U/L
AMPHET UR QL SCN: NEGATIVE
ANION GAP SERPL CALC-SCNC: 5 MMOL/L (ref 0–18)
AST SERPL-CCNC: 31 U/L (ref 15–37)
BARBITURATES UR QL SCN: NEGATIVE
BENZODIAZ UR QL SCN: NEGATIVE
BILIRUB SERPL-MCNC: 0.4 MG/DL (ref 0.1–2)
BILIRUB UR QL STRIP.AUTO: NEGATIVE
BUN BLD-MCNC: 44 MG/DL (ref 7–18)
CALCIUM BLD-MCNC: 9.2 MG/DL (ref 8.5–10.1)
CANNABINOIDS UR QL SCN: NEGATIVE
CHLORIDE SERPL-SCNC: 104 MMOL/L (ref 98–112)
CLARITY UR REFRACT.AUTO: CLEAR
CO2 SERPL-SCNC: 25 MMOL/L (ref 21–32)
COCAINE UR QL: NEGATIVE
COLOR UR AUTO: YELLOW
CREAT BLD-MCNC: 1.84 MG/DL
CREAT UR-SCNC: 143 MG/DL
CREAT UR-SCNC: 146 MG/DL
GFR SERPLBLD BASED ON 1.73 SQ M-ARVRAT: 42 ML/MIN/1.73M2 (ref 60–?)
GLOBULIN PLAS-MCNC: 4.1 G/DL (ref 2.8–4.4)
GLUCOSE BLD-MCNC: 100 MG/DL (ref 70–99)
GLUCOSE BLD-MCNC: 122 MG/DL (ref 70–99)
GLUCOSE BLD-MCNC: 91 MG/DL (ref 70–99)
GLUCOSE BLD-MCNC: 95 MG/DL (ref 70–99)
GLUCOSE BLD-MCNC: 98 MG/DL (ref 70–99)
GLUCOSE UR STRIP.AUTO-MCNC: NEGATIVE MG/DL
KETONES UR STRIP.AUTO-MCNC: NEGATIVE MG/DL
LEUKOCYTE ESTERASE UR QL STRIP.AUTO: NEGATIVE
MAGNESIUM SERPL-MCNC: 1.7 MG/DL (ref 1.6–2.6)
MDMA UR QL SCN: NEGATIVE
METHADONE UR QL SCN: NEGATIVE
NITRITE UR QL STRIP.AUTO: NEGATIVE
OPIATES UR QL SCN: NEGATIVE
OSMOLALITY SERPL CALC.SUM OF ELEC: 290 MOSM/KG (ref 275–295)
OSMOLALITY UR: 687 MOSM/KG (ref 300–1300)
OXYCODONE UR QL SCN: NEGATIVE
PCP UR QL SCN: NEGATIVE
PH UR STRIP.AUTO: 5.5 [PH] (ref 5–8)
POTASSIUM SERPL-SCNC: 4.6 MMOL/L (ref 3.5–5.1)
PROT SERPL-MCNC: 7.1 G/DL (ref 6.4–8.2)
PROT UR STRIP.AUTO-MCNC: NEGATIVE MG/DL
RBC UR QL AUTO: NEGATIVE
SODIUM SERPL-SCNC: 123 MMOL/L
SODIUM SERPL-SCNC: 134 MMOL/L (ref 136–145)
SP GR UR STRIP.AUTO: 1.02 (ref 1–1.03)
SP GR UR STRIP.AUTO: 1.02 (ref 1–1.03)
UROBILINOGEN UR STRIP.AUTO-MCNC: 0.2 MG/DL

## 2022-08-29 PROCEDURE — 99232 SBSQ HOSP IP/OBS MODERATE 35: CPT | Performed by: HOSPITALIST

## 2022-08-29 NOTE — PROGRESS NOTES
PATIENT A&OX4 AND IN NO APPARENT DISTRESS. SATURATING ABOVE 90% ON O2 AT 3L/NC AT REST. HE DENIES DISCOMFORT. LESION NOTED UNDER HIS TONGUE. MAGIC MOUTHWASH WAS GIVEN as ORDERED. ALSO NOTED TONIGHT THAT THE SOLES ON BOTH OF HIS FEET ARE VERY RED. WILL ASK ROUNDING PHYSICIAN TO EXAMINE. \"IT HURTS WHEN I WALK. THAT'S WHY I DON'T WALK ANYMORE\". HE USED THE CPAP BETWEEN 2200 AND 0400. HE DENIES NAUSEA, NO VOMITING, NO STOOL. VOIDING GOOD AMOUNTS PER URINAL. IVF as ORDERED. BEDTIME ACCUCHECK = 96.

## 2022-08-29 NOTE — PLAN OF CARE
8/29 AM - Pt is A/O x 4, wears glasses. Pt is on room air, lung sounds are clear. BP and HR are normal, NSR. Patient had a BM this morning, voids. PT/OT worked with patient, no needs for home, ambulate as tolerated. Potential discharge tomorrow. Multidisciplinary Discharge Rounds held 8/29/2022. Treatment team members present today include , , Charge Nurse, Nurse, RT, PT and Pharmacy caring for [employer]. Other care providers present:    Mobility Goal: Ambulate as tolerated    Readmission Risk:     [x] Low     [] Medium     [] High    Active issue(s) requiring resolution prior to discharge: Kidney function    Anticipated discharge date: 8/30    Current discharge plan: DC to home    F/U appointment scheduled within 7 days. .. [] Transitional [facility] Clinic (T[facility])     [] Pulmonologist     [x] Primary Care Physician     [] Other Specialty    Watched the home discharge recovery videos related to diagnosis. .. [] Pneumonia     [] COPD    [] Home Health set up. [x] Care partner identified and updated with the plan of care.       Problem: Patient/Family Goals  Goal: Patient/Family Long Term Goal  Description: Patient's Long Term Goal: Go home    Interventions:  - Rest  - Work with PT/OT  - See additional Care Plan goals for specific interventions  Outcome: Progressing  Goal: Patient/Family Short Term Goal  Description: Patient's Short Term Goal:   08/28/2022 - WEAN O2  8/29 AM - Rest    Interventions:   - See additional Care Plan goals for specific interventions  Outcome: Progressing

## 2022-08-30 ENCOUNTER — APPOINTMENT (OUTPATIENT)
Dept: ULTRASOUND IMAGING | Facility: HOSPITAL | Age: 57
DRG: 682 | End: 2022-08-30
Attending: HOSPITALIST
Payer: COMMERCIAL

## 2022-08-30 ENCOUNTER — APPOINTMENT (OUTPATIENT)
Dept: ULTRASOUND IMAGING | Facility: HOSPITAL | Age: 57
End: 2022-08-30
Attending: HOSPITALIST
Payer: COMMERCIAL

## 2022-08-30 VITALS
WEIGHT: 293 LBS | HEART RATE: 88 BPM | OXYGEN SATURATION: 98 % | TEMPERATURE: 98 F | SYSTOLIC BLOOD PRESSURE: 126 MMHG | RESPIRATION RATE: 23 BRPM | BODY MASS INDEX: 45.99 KG/M2 | DIASTOLIC BLOOD PRESSURE: 60 MMHG | HEIGHT: 67 IN

## 2022-08-30 LAB
ALBUMIN SERPL-MCNC: 3.1 G/DL (ref 3.4–5)
ALBUMIN/GLOB SERPL: 0.7 {RATIO} (ref 1–2)
ALP LIVER SERPL-CCNC: 51 U/L
ALT SERPL-CCNC: 55 U/L
ANION GAP SERPL CALC-SCNC: 6 MMOL/L (ref 0–18)
AST SERPL-CCNC: 43 U/L (ref 15–37)
BASOPHILS # BLD AUTO: 0.03 X10(3) UL (ref 0–0.2)
BASOPHILS NFR BLD AUTO: 0.9 %
BILIRUB SERPL-MCNC: 0.3 MG/DL (ref 0.1–2)
BUN BLD-MCNC: 36 MG/DL (ref 7–18)
CALCIUM BLD-MCNC: 9.5 MG/DL (ref 8.5–10.1)
CHLORIDE SERPL-SCNC: 103 MMOL/L (ref 98–112)
CO2 SERPL-SCNC: 25 MMOL/L (ref 21–32)
CREAT BLD-MCNC: 1.49 MG/DL
EOSINOPHIL # BLD AUTO: 0.05 X10(3) UL (ref 0–0.7)
EOSINOPHIL NFR BLD AUTO: 1.4 %
ERYTHROCYTE [DISTWIDTH] IN BLOOD BY AUTOMATED COUNT: 12.9 %
GFR SERPLBLD BASED ON 1.73 SQ M-ARVRAT: 54 ML/MIN/1.73M2 (ref 60–?)
GLOBULIN PLAS-MCNC: 4.2 G/DL (ref 2.8–4.4)
GLUCOSE BLD-MCNC: 97 MG/DL (ref 70–99)
GLUCOSE BLD-MCNC: 97 MG/DL (ref 70–99)
HCT VFR BLD AUTO: 37.1 %
HGB BLD-MCNC: 12 G/DL
IMM GRANULOCYTES # BLD AUTO: 0.01 X10(3) UL (ref 0–1)
IMM GRANULOCYTES NFR BLD: 0.3 %
LYMPHOCYTES # BLD AUTO: 0.8 X10(3) UL (ref 1–4)
LYMPHOCYTES NFR BLD AUTO: 22.9 %
MCH RBC QN AUTO: 29.6 PG (ref 26–34)
MCHC RBC AUTO-ENTMCNC: 32.3 G/DL (ref 31–37)
MCV RBC AUTO: 91.6 FL
MONOCYTES # BLD AUTO: 0.43 X10(3) UL (ref 0.1–1)
MONOCYTES NFR BLD AUTO: 12.3 %
NEUTROPHILS # BLD AUTO: 2.17 X10 (3) UL (ref 1.5–7.7)
NEUTROPHILS # BLD AUTO: 2.17 X10(3) UL (ref 1.5–7.7)
NEUTROPHILS NFR BLD AUTO: 62.2 %
OSMOLALITY SERPL CALC.SUM OF ELEC: 286 MOSM/KG (ref 275–295)
PLATELET # BLD AUTO: 158 10(3)UL (ref 150–450)
POTASSIUM SERPL-SCNC: 4.7 MMOL/L (ref 3.5–5.1)
PROT SERPL-MCNC: 7.3 G/DL (ref 6.4–8.2)
RBC # BLD AUTO: 4.05 X10(6)UL
SODIUM SERPL-SCNC: 134 MMOL/L (ref 136–145)
WBC # BLD AUTO: 3.5 X10(3) UL (ref 4–11)

## 2022-08-30 PROCEDURE — 76770 US EXAM ABDO BACK WALL COMP: CPT | Performed by: HOSPITALIST

## 2022-08-30 PROCEDURE — 99239 HOSP IP/OBS DSCHRG MGMT >30: CPT | Performed by: HOSPITALIST

## 2022-08-30 NOTE — PROGRESS NOTES
[facility] [address], [address]  ?  08/30/22  ? Re: Selena Busby  ? To Whom It May Concern:    Selena Busby was admitted to [facility] from 8/27/2022 to 08/30/22. Please excuse Selena Busby from attending work for these days. The patient may return to work on 9/6/2022. Patient will follow up with their primary care physician upon discharge. Further restrictions and work excuse will be based on primary care physician's evaluation. ?   Thank you,    Liz Cody MD, MD  [facility]

## 2022-08-30 NOTE — PROGRESS NOTES
NURSING DISCHARGE NOTE    Discharged Home via Wheelchair. Accompanied by Support staff  Belongings Taken by patient/family.     - Removed tele and IV  - Completed education and discharge planning  - Patient will follow up with PCP  - Patient taken home via private vehicle

## 2022-08-31 ENCOUNTER — PATIENT OUTREACH (OUTPATIENT)
Dept: CASE MANAGEMENT | Age: 57
End: 2022-08-31

## 2022-08-31 DIAGNOSIS — Z02.9 ENCOUNTERS FOR ADMINISTRATIVE PURPOSE: ICD-10-CM

## 2022-08-31 NOTE — PAYOR COMM NOTE
--------------  DISCHARGE REVIEW    Payor: [facility]  Subscriber #:  [id number]  Authorization Number: [id number]    Admit date: 8/28/22  Admit time:  12:20 AM  Discharge Date: 8/30/2022  2:30 PM     Admitting Physician: Sofya Madrid DO  Attending Physician:  No att. providers found  Primary Care Physician: Minesh Correia DO       Discharge Summary Notes    No notes of this type exist for this encounter.          REVIEWER COMMENTS

## 2022-09-08 ENCOUNTER — OFFICE VISIT (OUTPATIENT)
Dept: FAMILY MEDICINE CLINIC | Facility: CLINIC | Age: 57
End: 2022-09-08

## 2022-09-08 VITALS
HEART RATE: 96 BPM | DIASTOLIC BLOOD PRESSURE: 62 MMHG | SYSTOLIC BLOOD PRESSURE: 130 MMHG | RESPIRATION RATE: 22 BRPM | OXYGEN SATURATION: 96 % | WEIGHT: 298 LBS | HEIGHT: 67 IN | BODY MASS INDEX: 46.77 KG/M2

## 2022-09-08 DIAGNOSIS — E86.1 HYPOTENSION DUE TO HYPOVOLEMIA: ICD-10-CM

## 2022-09-08 DIAGNOSIS — K02.9 TOOTH DECAY: ICD-10-CM

## 2022-09-08 DIAGNOSIS — I95.89 HYPOTENSION DUE TO HYPOVOLEMIA: ICD-10-CM

## 2022-09-08 DIAGNOSIS — K14.0 TONGUE ULCER: ICD-10-CM

## 2022-09-08 DIAGNOSIS — I10 PRIMARY HYPERTENSION: Primary | ICD-10-CM

## 2022-09-08 PROCEDURE — 3078F DIAST BP <80 MM HG: CPT | Performed by: FAMILY MEDICINE

## 2022-09-08 PROCEDURE — 3075F SYST BP GE 130 - 139MM HG: CPT | Performed by: FAMILY MEDICINE

## 2022-09-08 PROCEDURE — 3008F BODY MASS INDEX DOCD: CPT | Performed by: FAMILY MEDICINE

## 2022-09-08 PROCEDURE — 99495 TRANSJ CARE MGMT MOD F2F 14D: CPT | Performed by: FAMILY MEDICINE

## 2022-09-08 RX ORDER — DILTIAZEM HYDROCHLORIDE 120 MG/1
120 CAPSULE, EXTENDED RELEASE ORAL DAILY
Qty: 90 CAPSULE | Refills: 3 | Status: SHIPPED | OUTPATIENT
Start: 2022-09-08 | End: 2023-09-03

## 2022-09-08 RX ORDER — TRIAMCINOLONE ACETONIDE 0.1 %
1 PASTE (GRAM) DENTAL 2 TIMES DAILY
Qty: 1 EACH | Refills: 0 | Status: SHIPPED | OUTPATIENT
Start: 2022-09-08 | End: 2022-09-08

## 2022-09-20 RX ORDER — PRAVASTATIN SODIUM 20 MG
TABLET ORAL
Qty: 90 TABLET | Refills: 3 | Status: SHIPPED | OUTPATIENT
Start: 2022-09-20

## 2022-09-24 DIAGNOSIS — F41.9 ANXIETY DISORDER, UNSPECIFIED: ICD-10-CM

## 2022-09-27 RX ORDER — ESCITALOPRAM OXALATE 20 MG/1
TABLET ORAL
Qty: 90 TABLET | Refills: 1 | Status: SHIPPED | OUTPATIENT
Start: 2022-09-27

## 2022-09-30 RX ORDER — SILDENAFIL 50 MG/1
TABLET, FILM COATED ORAL
Qty: 16 TABLET | Refills: 5 | Status: SHIPPED | OUTPATIENT
Start: 2022-09-30 | End: 2022-10-18

## 2022-10-13 NOTE — RESPIRATORY THERAPY NOTE
ZEB ( CPAP/BIPAP) DAILY USAGE SUMMARY    TOTAL HOURS USED  8  HOURS AND 36   MINUTES. <-- Click to add NO pertinent Family History

## 2022-10-18 ENCOUNTER — OFFICE VISIT (OUTPATIENT)
Dept: FAMILY MEDICINE CLINIC | Facility: CLINIC | Age: 57
End: 2022-10-18
Payer: COMMERCIAL

## 2022-10-18 VITALS
OXYGEN SATURATION: 98 % | BODY MASS INDEX: 46.77 KG/M2 | RESPIRATION RATE: 22 BRPM | HEIGHT: 67 IN | DIASTOLIC BLOOD PRESSURE: 68 MMHG | HEART RATE: 96 BPM | WEIGHT: 298 LBS | SYSTOLIC BLOOD PRESSURE: 142 MMHG

## 2022-10-18 DIAGNOSIS — M25.552 LEFT HIP PAIN: ICD-10-CM

## 2022-10-18 DIAGNOSIS — L03.116 LEFT LEG CELLULITIS: Primary | ICD-10-CM

## 2022-10-18 PROCEDURE — 90686 IIV4 VACC NO PRSV 0.5 ML IM: CPT | Performed by: INTERNAL MEDICINE

## 2022-10-18 PROCEDURE — 90471 IMMUNIZATION ADMIN: CPT | Performed by: INTERNAL MEDICINE

## 2022-10-18 PROCEDURE — 3078F DIAST BP <80 MM HG: CPT | Performed by: INTERNAL MEDICINE

## 2022-10-18 PROCEDURE — 3077F SYST BP >= 140 MM HG: CPT | Performed by: INTERNAL MEDICINE

## 2022-10-18 PROCEDURE — 99213 OFFICE O/P EST LOW 20 MIN: CPT | Performed by: INTERNAL MEDICINE

## 2022-10-18 PROCEDURE — 3008F BODY MASS INDEX DOCD: CPT | Performed by: INTERNAL MEDICINE

## 2022-10-18 RX ORDER — HYDROCODONE BITARTRATE AND ACETAMINOPHEN 5; 325 MG/1; MG/1
1 TABLET ORAL EVERY 8 HOURS PRN
Qty: 6 TABLET | Refills: 0 | Status: SHIPPED | OUTPATIENT
Start: 2022-10-18

## 2022-10-18 RX ORDER — AMOXICILLIN AND CLAVULANATE POTASSIUM 875; 125 MG/1; MG/1
1 TABLET, FILM COATED ORAL 2 TIMES DAILY
Qty: 20 TABLET | Refills: 0 | Status: SHIPPED | OUTPATIENT
Start: 2022-10-18 | End: 2022-10-23

## 2022-10-20 ENCOUNTER — PATIENT MESSAGE (OUTPATIENT)
Dept: FAMILY MEDICINE CLINIC | Facility: CLINIC | Age: 57
End: 2022-10-20

## 2022-10-20 ENCOUNTER — TELEPHONE (OUTPATIENT)
Dept: FAMILY MEDICINE CLINIC | Facility: CLINIC | Age: 57
End: 2022-10-20

## 2022-10-20 RX ORDER — CLINDAMYCIN HYDROCHLORIDE 300 MG/1
300 CAPSULE ORAL 3 TIMES DAILY
Qty: 30 CAPSULE | Refills: 0 | Status: SHIPPED | OUTPATIENT
Start: 2022-10-20 | End: 2022-10-23

## 2022-10-20 NOTE — TELEPHONE ENCOUNTER
Stop the augmentin. I sent Clindamycin to cover possible resistance to augmentin. TID x 10 days. I told him to schedule a follow up appt on Monday with Dr. Karen Romero but I don't see any appt yet.

## 2022-10-20 NOTE — TELEPHONE ENCOUNTER
From: Anita Munguia  To: Shaunna Cortez NP  Sent: 10/20/2022 12:14 PM CDT  Subject: My left leg wound    Dr. I am not the ampicillin you prescribed me is working. So far, I have taken 3 doses. Last night, I washed my wound with Hibiclens and bottled water. See attached picture. This morning, I felt a little nauseous, but that feeling is gone now.     Please advise, thank you

## 2022-10-21 ENCOUNTER — HOSPITAL ENCOUNTER (INPATIENT)
Facility: HOSPITAL | Age: 57
LOS: 2 days | Discharge: HOME | End: 2022-10-23
Attending: EMERGENCY MEDICINE | Admitting: HOSPITALIST
Payer: COMMERCIAL

## 2022-10-21 ENCOUNTER — APPOINTMENT (OUTPATIENT)
Dept: GENERAL RADIOLOGY | Facility: HOSPITAL | Age: 57
End: 2022-10-21
Attending: EMERGENCY MEDICINE
Payer: COMMERCIAL

## 2022-10-21 DIAGNOSIS — L03.116 CELLULITIS OF LEFT LOWER EXTREMITY: Primary | ICD-10-CM

## 2022-10-21 PROBLEM — R73.9 HYPERGLYCEMIA: Status: ACTIVE | Noted: 2022-10-21

## 2022-10-21 LAB
ADENOVIRUS PCR:: NOT DETECTED
ALBUMIN SERPL-MCNC: 3.3 G/DL (ref 3.4–5)
ALBUMIN/GLOB SERPL: 0.7 {RATIO} (ref 1–2)
ALP LIVER SERPL-CCNC: 78 U/L
ALT SERPL-CCNC: 42 U/L
ANION GAP SERPL CALC-SCNC: 9 MMOL/L (ref 0–18)
AST SERPL-CCNC: 27 U/L (ref 15–37)
B PARAPERT DNA SPEC QL NAA+PROBE: NOT DETECTED
B PERT DNA SPEC QL NAA+PROBE: NOT DETECTED
BASOPHILS # BLD AUTO: 0.04 X10(3) UL (ref 0–0.2)
BASOPHILS NFR BLD AUTO: 0.5 %
BILIRUB SERPL-MCNC: 0.6 MG/DL (ref 0.1–2)
BUN BLD-MCNC: 17 MG/DL (ref 7–18)
C PNEUM DNA SPEC QL NAA+PROBE: NOT DETECTED
CALCIUM BLD-MCNC: 9.9 MG/DL (ref 8.5–10.1)
CHLORIDE SERPL-SCNC: 101 MMOL/L (ref 98–112)
CO2 SERPL-SCNC: 28 MMOL/L (ref 21–32)
CORONAVIRUS 229E PCR:: NOT DETECTED
CORONAVIRUS HKU1 PCR:: NOT DETECTED
CORONAVIRUS NL63 PCR:: NOT DETECTED
CORONAVIRUS OC43 PCR:: NOT DETECTED
CREAT BLD-MCNC: 1.25 MG/DL
EOSINOPHIL # BLD AUTO: 0.05 X10(3) UL (ref 0–0.7)
EOSINOPHIL NFR BLD AUTO: 0.7 %
ERYTHROCYTE [DISTWIDTH] IN BLOOD BY AUTOMATED COUNT: 13.8 %
FLUAV RNA SPEC QL NAA+PROBE: NOT DETECTED
FLUBV RNA SPEC QL NAA+PROBE: NOT DETECTED
GFR SERPLBLD BASED ON 1.73 SQ M-ARVRAT: 66 ML/MIN/1.73M2 (ref 60–?)
GLOBULIN PLAS-MCNC: 4.7 G/DL (ref 2.8–4.4)
GLUCOSE BLD-MCNC: 106 MG/DL (ref 70–99)
GLUCOSE BLD-MCNC: 123 MG/DL (ref 70–99)
GLUCOSE BLD-MCNC: 181 MG/DL (ref 70–99)
HCT VFR BLD AUTO: 44 %
HGB BLD-MCNC: 14 G/DL
IMM GRANULOCYTES # BLD AUTO: 0.06 X10(3) UL (ref 0–1)
IMM GRANULOCYTES NFR BLD: 0.8 %
LACTATE SERPL-SCNC: 1.9 MMOL/L (ref 0.4–2)
LACTATE SERPL-SCNC: 2.9 MMOL/L (ref 0.4–2)
LYMPHOCYTES # BLD AUTO: 1.08 X10(3) UL (ref 1–4)
LYMPHOCYTES NFR BLD AUTO: 14.5 %
MCH RBC QN AUTO: 29.8 PG (ref 26–34)
MCHC RBC AUTO-ENTMCNC: 31.8 G/DL (ref 31–37)
MCV RBC AUTO: 93.6 FL
METAPNEUMOVIRUS PCR:: NOT DETECTED
MONOCYTES # BLD AUTO: 0.63 X10(3) UL (ref 0.1–1)
MONOCYTES NFR BLD AUTO: 8.4 %
MYCOPLASMA PNEUMONIA PCR:: NOT DETECTED
NEUTROPHILS # BLD AUTO: 5.61 X10 (3) UL (ref 1.5–7.7)
NEUTROPHILS # BLD AUTO: 5.61 X10(3) UL (ref 1.5–7.7)
NEUTROPHILS NFR BLD AUTO: 75.1 %
OSMOLALITY SERPL CALC.SUM OF ELEC: 292 MOSM/KG (ref 275–295)
PARAINFLUENZA 1 PCR:: NOT DETECTED
PARAINFLUENZA 2 PCR:: NOT DETECTED
PARAINFLUENZA 3 PCR:: NOT DETECTED
PARAINFLUENZA 4 PCR:: NOT DETECTED
PLATELET # BLD AUTO: 186 10(3)UL (ref 150–450)
POTASSIUM SERPL-SCNC: 4.1 MMOL/L (ref 3.5–5.1)
PROT SERPL-MCNC: 8 G/DL (ref 6.4–8.2)
RBC # BLD AUTO: 4.7 X10(6)UL
RHINOVIRUS/ENTERO PCR:: NOT DETECTED
RSV RNA SPEC QL NAA+PROBE: NOT DETECTED
SARS-COV-2 RNA NPH QL NAA+NON-PROBE: NOT DETECTED
SARS-COV-2 RNA RESP QL NAA+PROBE: NOT DETECTED
SODIUM SERPL-SCNC: 138 MMOL/L (ref 136–145)
WBC # BLD AUTO: 7.5 X10(3) UL (ref 4–11)

## 2022-10-21 PROCEDURE — 5A09357 ASSISTANCE WITH RESPIRATORY VENTILATION, LESS THAN 24 CONSECUTIVE HOURS, CONTINUOUS POSITIVE AIRWAY PRESSURE: ICD-10-PCS | Performed by: INTERNAL MEDICINE

## 2022-10-21 PROCEDURE — 73590 X-RAY EXAM OF LOWER LEG: CPT | Performed by: EMERGENCY MEDICINE

## 2022-10-21 PROCEDURE — 99223 1ST HOSP IP/OBS HIGH 75: CPT | Performed by: INTERNAL MEDICINE

## 2022-10-21 RX ORDER — TAMSULOSIN HYDROCHLORIDE 0.4 MG/1
0.4 CAPSULE ORAL DAILY
Status: DISCONTINUED | OUTPATIENT
Start: 2022-10-22 | End: 2022-10-23

## 2022-10-21 RX ORDER — HYDROCODONE BITARTRATE AND ACETAMINOPHEN 5; 325 MG/1; MG/1
1 TABLET ORAL EVERY 8 HOURS PRN
Status: DISCONTINUED | OUTPATIENT
Start: 2022-10-21 | End: 2022-10-23

## 2022-10-21 RX ORDER — PHENTERMINE HYDROCHLORIDE 37.5 MG/1
37.5 TABLET ORAL
Status: DISCONTINUED | OUTPATIENT
Start: 2022-10-22 | End: 2022-10-21

## 2022-10-21 RX ORDER — DILTIAZEM HYDROCHLORIDE 120 MG/1
120 CAPSULE, EXTENDED RELEASE ORAL DAILY
Status: DISCONTINUED | OUTPATIENT
Start: 2022-10-21 | End: 2022-10-23

## 2022-10-21 RX ORDER — ALBUTEROL SULFATE 90 UG/1
1 AEROSOL, METERED RESPIRATORY (INHALATION) EVERY 4 HOURS PRN
Status: DISCONTINUED | OUTPATIENT
Start: 2022-10-21 | End: 2022-10-23

## 2022-10-21 RX ORDER — VANCOMYCIN 2 GRAM/500 ML IN 0.9 % SODIUM CHLORIDE INTRAVENOUS
20 EVERY 24 HOURS
Status: DISCONTINUED | OUTPATIENT
Start: 2022-10-21 | End: 2022-10-21

## 2022-10-21 RX ORDER — FLUTICASONE FUROATE AND VILANTEROL 200; 25 UG/1; UG/1
1 POWDER RESPIRATORY (INHALATION) DAILY
Status: DISCONTINUED | OUTPATIENT
Start: 2022-10-21 | End: 2022-10-23

## 2022-10-21 RX ORDER — ENOXAPARIN SODIUM 100 MG/ML
0.5 INJECTION SUBCUTANEOUS DAILY
Status: DISCONTINUED | OUTPATIENT
Start: 2022-10-22 | End: 2022-10-23

## 2022-10-21 RX ORDER — ALPRAZOLAM 0.5 MG/1
0.5 TABLET ORAL 2 TIMES DAILY PRN
Status: DISCONTINUED | OUTPATIENT
Start: 2022-10-21 | End: 2022-10-23

## 2022-10-21 RX ORDER — ESCITALOPRAM OXALATE 20 MG/1
20 TABLET ORAL DAILY
Status: DISCONTINUED | OUTPATIENT
Start: 2022-10-21 | End: 2022-10-23

## 2022-10-21 RX ORDER — IPRATROPIUM BROMIDE AND ALBUTEROL SULFATE 2.5; .5 MG/3ML; MG/3ML
3 SOLUTION RESPIRATORY (INHALATION) EVERY 4 HOURS PRN
Status: DISCONTINUED | OUTPATIENT
Start: 2022-10-21 | End: 2022-10-23

## 2022-10-21 RX ORDER — GABAPENTIN 100 MG/1
100 CAPSULE ORAL 3 TIMES DAILY
Status: DISCONTINUED | OUTPATIENT
Start: 2022-10-21 | End: 2022-10-23

## 2022-10-21 RX ORDER — PANTOPRAZOLE SODIUM 40 MG/1
40 TABLET, DELAYED RELEASE ORAL
Status: DISCONTINUED | OUTPATIENT
Start: 2022-10-22 | End: 2022-10-23

## 2022-10-21 RX ORDER — VANCOMYCIN HYDROCHLORIDE
15
Status: DISCONTINUED | OUTPATIENT
Start: 2022-10-22 | End: 2022-10-23

## 2022-10-21 RX ORDER — VANCOMYCIN 2 GRAM/500 ML IN 0.9 % SODIUM CHLORIDE INTRAVENOUS
25 ONCE
Status: COMPLETED | OUTPATIENT
Start: 2022-10-21 | End: 2022-10-21

## 2022-10-21 RX ORDER — FLUTICASONE PROPIONATE 50 MCG
2 SPRAY, SUSPENSION (ML) NASAL DAILY
Status: DISCONTINUED | OUTPATIENT
Start: 2022-10-21 | End: 2022-10-23

## 2022-10-21 RX ORDER — PRAVASTATIN SODIUM 20 MG
20 TABLET ORAL NIGHTLY
Status: DISCONTINUED | OUTPATIENT
Start: 2022-10-21 | End: 2022-10-23

## 2022-10-21 NOTE — ED QUICK NOTES
Patient reported roof of mouth itching after staring Vancomycin but states resolved after 20 minutes, MD notified.  Will continue to watch and monitor

## 2022-10-21 NOTE — ED QUICK NOTES
Orders for admission, patient is aware of plan and ready to go upstairs. Any questions, please call ED RN Glen Lau at extension [phone].      Patient Covid vaccination status: Fully vaccinated     COVID Test Ordered in ED: Rapid SARS-CoV-2 by PCR    COVID Suspicion at Admission: Low clinical suspicion for COVID    Running Infusions:      Mental Status/LOC at time of transport: A&O x 4    Other pertinent information:   CIWA score: N/A   NIH score:  N/A

## 2022-10-21 NOTE — PLAN OF CARE
Patient A&Ox4, VSS on room air. Cpap at night for ZEB. Patient with lower left leg swelling and redness. Incision noted, sutures removed today reported by patient. Resting comfortably in bed. Regular diet tolerated, DTV, LBM 10/20. IV ABX. Plan of care reviewed with patient, all questions answered, verbalized understanding.

## 2022-10-21 NOTE — ED INITIAL ASSESSMENT (HPI)
Patient reports c/o left lower leg swelling,redness, and warmth. Patient fell downstairs on 10/8/22 and had sutures placed, sutures still intact.

## 2022-10-22 LAB
CREAT BLD-MCNC: 0.95 MG/DL
GFR SERPLBLD BASED ON 1.73 SQ M-ARVRAT: 92 ML/MIN/1.73M2 (ref 60–?)
GLUCOSE BLD-MCNC: 105 MG/DL (ref 70–99)
GLUCOSE BLD-MCNC: 122 MG/DL (ref 70–99)
GLUCOSE BLD-MCNC: 122 MG/DL (ref 70–99)
GLUCOSE BLD-MCNC: 144 MG/DL (ref 70–99)

## 2022-10-22 PROCEDURE — 99232 SBSQ HOSP IP/OBS MODERATE 35: CPT | Performed by: INTERNAL MEDICINE

## 2022-10-22 NOTE — PLAN OF CARE
Patient A & O x4. VSS, on RA. ZEB with CPAP. C/o mild pain, controlled with norco. LLE swelling and redness. Espitia outlined to monitor redness. Incision to shin open to air. Voiding freely via urinal. Safety measures in place. Instructed to use call light.

## 2022-10-22 NOTE — PLAN OF CARE
Assumed patient care at 0730. Vital signs stable. Patient alert and oriented x 4. Patient denies pain. Redness to left lower leg is decreasing. ID wants patient to stay until cultures are back. Patient updated on plan of care.      Problem: Patient/Family Goals  Goal: Patient/Family Long Term Goal  Description: Patient's Long Term Goal: To be healed and back to normal activity    Interventions:  -Pain management  -PT/OT  -Follow up with PCP as recommended    Outcome: Progressing  Goal: Patient/Family Short Term Goal  Description: Patients Short Term goal: to be able to eat, drink, go to the bathroom, change positions, sit in chair and work with therapy with pain controlled    Interventions:  -Pain management  -Activity as tolerated  -PT/OT  -Follow up with PCP as recommended    Outcome: Progressing     Problem: PAIN - ADULT  Goal: Verbalizes/displays adequate comfort level or patient's stated pain goal  Description: INTERVENTIONS:  - Encourage pt to monitor pain and request assistance  - Assess pain using appropriate pain scale  - Administer analgesics based on type and severity of pain and evaluate response  - Implement non-pharmacological measures as appropriate and evaluate response  - Consider cultural and social influences on pain and pain management  - Manage/alleviate anxiety  - Utilize distraction and/or relaxation techniques  - Monitor for opioid side effects  - Notify MD/LIP if interventions unsuccessful or patient reports new pain  - Anticipate increased pain with activity and pre-medicate as appropriate  Outcome: Progressing     Problem: RISK FOR INFECTION - ADULT  Goal: Absence of fever/infection during anticipated neutropenic period  Description: INTERVENTIONS  - Monitor WBC  - Administer growth factors as ordered  - Implement neutropenic guidelines  Outcome: Progressing     Problem: SAFETY ADULT - FALL  Goal: Free from fall injury  Description: INTERVENTIONS:  - Assess pt frequently for physical needs  - Identify cognitive and physical deficits and behaviors that affect risk of falls.   - [place] fall precautions as indicated by assessment.  - Educate pt/family on patient safety including physical limitations  - Instruct pt to call for assistance with activity based on assessment  - Modify environment to reduce risk of injury  - Provide assistive devices as appropriate  - Consider OT/PT consult to assist with strengthening/mobility  - Encourage toileting schedule  Outcome: Progressing     Problem: DISCHARGE PLANNING  Goal: Discharge to home or other facility with appropriate resources  Description: INTERVENTIONS:  - Identify barriers to discharge w/pt and caregiver  - Include patient/family/discharge partner in discharge planning  - Arrange for needed discharge resources and transportation as appropriate  - Identify discharge learning needs (meds, wound care, etc)  - Arrange for interpreters to assist at discharge as needed  - Consider post-discharge preferences of patient/family/discharge partner  - Complete POLST form as appropriate  - Assess patient's ability to be responsible for managing their own health  - Refer to Case Management Department for coordinating discharge planning if the patient needs post-hospital services based on physician/LIP order or complex needs related to functional status, cognitive ability or social support system  Outcome: Progressing

## 2022-10-22 NOTE — CONSULTS
[facility] Dosing Service    Initial Pharmacokinetic Consult for Vancomycin AUC Dosing    Yasmeen Luis is a 59year old patient who is being treated for cellulitis. Pharmacy has been asked to dose vancomycin by Dr. Mary Gibbons.    Weights:  Ideal body weight: 66.1 kg (145 lb 11.6 oz)  Adjusted ideal body weight: 93.7 kg (206 lb 10.2 oz)  Actual weight:  135.2 kg (298 lb)    Labs:  Lab Results   Component Value Date    CREATSERUM 1.25 10/21/2022      Estimated Creatinine Clearance: 59.5 mL/min (based on SCr of 1.25 mg/dL). Based on the above:    1. This patient will receive a loading dose of Vancomycin  2000 mg IVPB (25mg/kg, capped at 2000 mg) x 1 dose. This will be followed by 1500 mg Q 18 hours based upon adjusted body weight of 93.7 kg and renal function. 2. Vancomycin peak and trough will be obtained at steady state in order to calculate AUC24. Goal AUC24 is 400-600 mg-h/L.    3. Pharmacy will order SCr as clinically indicated while on vancomycin to assess renal function. 4. Pharmacy will follow and monitor renal function, toxicity and efficacy. We appreciate the opportunity to assist in the care of this patient.     Peg Mansfield, PeymanD  10/21/2022  7:01 PM  [address] [place] [address] Extension: [phone]

## 2022-10-23 VITALS
TEMPERATURE: 98 F | HEART RATE: 82 BPM | WEIGHT: 298 LBS | DIASTOLIC BLOOD PRESSURE: 80 MMHG | RESPIRATION RATE: 20 BRPM | BODY MASS INDEX: 47 KG/M2 | SYSTOLIC BLOOD PRESSURE: 153 MMHG | OXYGEN SATURATION: 92 %

## 2022-10-23 LAB
CREAT BLD-MCNC: 1.01 MG/DL
GFR SERPLBLD BASED ON 1.73 SQ M-ARVRAT: 86 ML/MIN/1.73M2 (ref 60–?)
GLUCOSE BLD-MCNC: 105 MG/DL (ref 70–99)
GLUCOSE BLD-MCNC: 123 MG/DL (ref 70–99)

## 2022-10-23 PROCEDURE — 99239 HOSP IP/OBS DSCHRG MGMT >30: CPT | Performed by: INTERNAL MEDICINE

## 2022-10-23 RX ORDER — CEFAZOLIN SODIUM/WATER 2 G/20 ML
2 SYRINGE (ML) INTRAVENOUS EVERY 8 HOURS
Status: DISCONTINUED | OUTPATIENT
Start: 2022-10-23 | End: 2022-10-23

## 2022-10-23 RX ORDER — CEFADROXIL 500 MG/1
1 CAPSULE ORAL 2 TIMES DAILY
Qty: 40 CAPSULE | Refills: 0 | Status: SHIPPED | OUTPATIENT
Start: 2022-10-23 | End: 2022-11-02

## 2022-10-23 NOTE — PLAN OF CARE
ER admit 10/21 infected Lt leg wound , Pt is AAOX4, VSS, room air, IV ABX, plan to transition to PO ABX on discharge, TELE, ZEB CPAP, glucose monitored and treated per orders, up w/ 1 assist and RW, PO meds for pain, see MAR, voiding freely to urinal or restroom, wound care as needed, open to air, Pt doing well, all needs met, all safety measures in place, call light within reach, will CTM.

## 2022-10-23 NOTE — PLAN OF CARE
NURSING DISCHARGE NOTE    Discharged Home via Wheelchair. Accompanied by Family member  Belongings Taken by patient/family. AVS printed and discussed, IV removed, Rx given to Pt for ABX, reminded to  medicine at pharmacy. Pt ready to discharge home.

## 2022-10-23 NOTE — PLAN OF CARE
Patient A & O x4. VSS, on RA. ZEB with CPAP. C/o mild pain, controlled with norco. LLE swelling and redness. Espitia outlined to monitor redness. Incision to shin open to air. Receiving IV Vanco. Voiding freely via urinal. Safety measures in place. Instructed to use call light.

## 2022-10-24 ENCOUNTER — PATIENT OUTREACH (OUTPATIENT)
Dept: CASE MANAGEMENT | Age: 57
End: 2022-10-24

## 2022-10-24 DIAGNOSIS — Z02.9 ENCOUNTERS FOR UNSPECIFIED ADMINISTRATIVE PURPOSE: ICD-10-CM

## 2022-10-24 DIAGNOSIS — L03.116 CELLULITIS OF LEFT LOWER EXTREMITY: ICD-10-CM

## 2022-10-24 NOTE — PAYOR COMM NOTE
Discharge Notification    Patient Name: Antonio Logan  Payor: [address] [place] [address]  Subscriber #: [id number]  Authorization Number: [id number]  Admit Date/Time: 10/21/2022 11:47 AM  Discharge Date/Time: 10/23/2022 2:25 PM

## 2022-10-25 ENCOUNTER — PATIENT MESSAGE (OUTPATIENT)
Dept: FAMILY MEDICINE CLINIC | Facility: CLINIC | Age: 57
End: 2022-10-25

## 2022-10-25 ENCOUNTER — TELEPHONE (OUTPATIENT)
Dept: FAMILY MEDICINE CLINIC | Facility: CLINIC | Age: 57
End: 2022-10-25

## 2022-10-25 NOTE — TELEPHONE ENCOUNTER
Pt discharged 10/23/22. Pt does not have HFU appt scheduled at this time. TCM/HFU appt recommended by 10/30/22 as pt is a high risk for readmission. Please discuss with PCP and contact pt accordingly. Thank you! NCM attempted to schedule TCM/HFU, however no availability within timeframe recommended. Please discuss with PCP and advise. Thank you. Patient is also requesting a refill on his Norco. Please discuss with PCP and advise. Thank you.      BOOK BY DATE (last date for TCM): 11/6/22

## 2022-10-26 NOTE — TELEPHONE ENCOUNTER
From: [facility]  To: Anjum Haider DO  Sent: 10/25/2022 10:19 PM CDT  Subject: My wound plus request     Attached is my wound as of today. The med prescribed to me (Cefadroxil) from the hospital seems to be helping. Can you please refill my prescription of Norco? It just hurts so much walk at work. Thank you. Also, my recent hospital stay should have the lab work you ordered.

## 2022-10-26 NOTE — TELEPHONE ENCOUNTER
YP, please see wound pics on cefadroxil, refill request for norco, approve/deny:     Future appt: 11/5/22

## 2022-10-27 RX ORDER — HYDROCODONE BITARTRATE AND ACETAMINOPHEN 5; 325 MG/1; MG/1
1 TABLET ORAL EVERY 8 HOURS PRN
Qty: 30 TABLET | Refills: 0 | Status: SHIPPED | OUTPATIENT
Start: 2022-10-27

## 2022-10-28 ENCOUNTER — OFFICE VISIT (OUTPATIENT)
Dept: FAMILY MEDICINE CLINIC | Facility: CLINIC | Age: 57
End: 2022-10-28
Payer: COMMERCIAL

## 2022-10-28 DIAGNOSIS — S81.812D LACERATION OF LEFT LOWER EXTREMITY, SUBSEQUENT ENCOUNTER: Primary | ICD-10-CM

## 2022-10-28 DIAGNOSIS — L03.116 CELLULITIS OF LEFT LEG: ICD-10-CM

## 2022-10-28 PROCEDURE — 99213 OFFICE O/P EST LOW 20 MIN: CPT | Performed by: FAMILY MEDICINE

## 2022-10-28 RX ORDER — FUROSEMIDE 20 MG/1
20 TABLET ORAL DAILY
Qty: 20 TABLET | Refills: 0 | Status: SHIPPED | OUTPATIENT
Start: 2022-10-28

## 2022-10-28 RX ORDER — DILTIAZEM HYDROCHLORIDE 240 MG/1
240 CAPSULE, EXTENDED RELEASE ORAL DAILY
Qty: 90 CAPSULE | Refills: 3 | Status: SHIPPED | OUTPATIENT
Start: 2022-10-28 | End: 2023-10-23

## 2022-10-28 RX ORDER — POTASSIUM CHLORIDE 750 MG/1
10 TABLET, FILM COATED, EXTENDED RELEASE ORAL DAILY
Qty: 20 TABLET | Refills: 0 | Status: SHIPPED | OUTPATIENT
Start: 2022-10-28

## 2022-11-03 ENCOUNTER — PATIENT MESSAGE (OUTPATIENT)
Dept: FAMILY MEDICINE CLINIC | Facility: CLINIC | Age: 57
End: 2022-11-03

## 2022-11-15 DIAGNOSIS — F32.A DEPRESSION, UNSPECIFIED DEPRESSION TYPE: ICD-10-CM

## 2022-11-15 DIAGNOSIS — F41.9 ANXIETY: ICD-10-CM

## 2022-11-15 RX ORDER — ALBUTEROL SULFATE 90 UG/1
AEROSOL, METERED RESPIRATORY (INHALATION)
Qty: 8.5 EACH | Refills: 1 | Status: SHIPPED | OUTPATIENT
Start: 2022-11-15

## 2022-11-15 RX ORDER — ERGOCALCIFEROL 1.25 MG/1
CAPSULE ORAL
Qty: 12 CAPSULE | Refills: 0 | OUTPATIENT
Start: 2022-11-15

## 2022-11-15 RX ORDER — ALPRAZOLAM 0.5 MG/1
TABLET, ORALLY DISINTEGRATING ORAL
Qty: 24 TABLET | Refills: 1 | Status: SHIPPED | OUTPATIENT
Start: 2022-11-15

## 2022-11-18 ENCOUNTER — TELEPHONE (OUTPATIENT)
Dept: FAMILY MEDICINE CLINIC | Facility: CLINIC | Age: 57
End: 2022-11-18

## 2022-11-18 DIAGNOSIS — M25.552 LEFT HIP PAIN: Primary | ICD-10-CM

## 2022-11-18 RX ORDER — HYDROCODONE BITARTRATE AND ACETAMINOPHEN 5; 325 MG/1; MG/1
1 TABLET ORAL EVERY 8 HOURS PRN
Qty: 30 TABLET | Refills: 0 | Status: SHIPPED | OUTPATIENT
Start: 2022-11-18

## 2022-11-18 NOTE — TELEPHONE ENCOUNTER
Charlie Hartley calls in needing prescription for pain medicatoin.  Has bad issues with his hip, had discussed with Dr. Js Hunt in the past.    He has orthopedic surgery visit on 12/5/22,    Will fill, and recommend visit to discuss long term plan    Guillermo Monroy MD

## 2022-12-31 DIAGNOSIS — E66.01 CLASS 3 SEVERE OBESITY WITH SERIOUS COMORBIDITY AND BODY MASS INDEX (BMI) OF 45.0 TO 49.9 IN ADULT, UNSPECIFIED OBESITY TYPE (HCC): ICD-10-CM

## 2023-01-01 DIAGNOSIS — E66.01 CLASS 3 SEVERE OBESITY WITH SERIOUS COMORBIDITY AND BODY MASS INDEX (BMI) OF 45.0 TO 49.9 IN ADULT, UNSPECIFIED OBESITY TYPE (HCC): ICD-10-CM

## 2023-01-04 RX ORDER — SEMAGLUTIDE 1.34 MG/ML
INJECTION, SOLUTION SUBCUTANEOUS
Refills: 1 | OUTPATIENT
Start: 2023-01-04 | End: 2023-05-02

## 2023-01-06 RX ORDER — HYDROCODONE BITARTRATE AND ACETAMINOPHEN 5; 325 MG/1; MG/1
1 TABLET ORAL EVERY 8 HOURS PRN
Qty: 30 TABLET | Refills: 0 | Status: SHIPPED | OUTPATIENT
Start: 2023-01-06

## 2023-01-06 RX ORDER — PHENTERMINE HYDROCHLORIDE 37.5 MG/1
TABLET ORAL
Qty: 30 TABLET | Refills: 0 | Status: SHIPPED | OUTPATIENT
Start: 2023-01-06

## 2023-01-29 DIAGNOSIS — E66.01 CLASS 3 SEVERE OBESITY WITH SERIOUS COMORBIDITY AND BODY MASS INDEX (BMI) OF 45.0 TO 49.9 IN ADULT, UNSPECIFIED OBESITY TYPE (HCC): ICD-10-CM

## 2023-01-30 RX ORDER — SEMAGLUTIDE 1.34 MG/ML
INJECTION, SOLUTION SUBCUTANEOUS
Refills: 1 | OUTPATIENT
Start: 2023-01-30 | End: 2023-05-28

## 2023-02-02 ENCOUNTER — TELEMEDICINE (OUTPATIENT)
Dept: FAMILY MEDICINE CLINIC | Facility: CLINIC | Age: 58
End: 2023-02-02

## 2023-02-02 DIAGNOSIS — Z00.00 LABORATORY EXAMINATION ORDERED AS PART OF A COMPLETE PHYSICAL EXAMINATION: ICD-10-CM

## 2023-02-02 DIAGNOSIS — R06.81 BREATHLESSNESS ON EXERTION: ICD-10-CM

## 2023-02-02 DIAGNOSIS — R30.0 DYSURIA: Primary | ICD-10-CM

## 2023-02-02 DIAGNOSIS — R39.9 LOWER URINARY TRACT SYMPTOMS: ICD-10-CM

## 2023-02-02 DIAGNOSIS — R73.03 PREDIABETES: ICD-10-CM

## 2023-02-02 DIAGNOSIS — M25.552 LEFT HIP PAIN: ICD-10-CM

## 2023-02-02 PROCEDURE — 99214 OFFICE O/P EST MOD 30 MIN: CPT | Performed by: FAMILY MEDICINE

## 2023-02-02 RX ORDER — HYDROCODONE BITARTRATE AND ACETAMINOPHEN 5; 325 MG/1; MG/1
1 TABLET ORAL EVERY 8 HOURS PRN
Qty: 30 TABLET | Refills: 0 | Status: SHIPPED | OUTPATIENT
Start: 2023-02-02

## 2023-02-02 RX ORDER — MELOXICAM 15 MG/1
15 TABLET ORAL DAILY
Qty: 30 TABLET | Refills: 1 | Status: SHIPPED | OUTPATIENT
Start: 2023-02-02 | End: 2024-01-28

## 2023-02-08 ENCOUNTER — PATIENT MESSAGE (OUTPATIENT)
Dept: FAMILY MEDICINE CLINIC | Facility: CLINIC | Age: 58
End: 2023-02-08

## 2023-02-08 DIAGNOSIS — M25.552 LEFT HIP PAIN: ICD-10-CM

## 2023-02-08 RX ORDER — HYDROCODONE BITARTRATE AND ACETAMINOPHEN 5; 325 MG/1; MG/1
1 TABLET ORAL EVERY 8 HOURS PRN
Qty: 30 TABLET | Refills: 0 | Status: SHIPPED | OUTPATIENT
Start: 2023-02-08

## 2023-02-08 NOTE — TELEPHONE ENCOUNTER
From: Danette Patel [facility]  To: Rhonda Tubbs DO  Sent: 2/8/2023 10:24 AM CST  Subject: [facility] is out of stock. Can you please send my norco script to [facility] on [address]  Thank you

## 2023-03-21 ENCOUNTER — HOSPITAL ENCOUNTER (EMERGENCY)
Facility: HOSPITAL | Age: 58
Discharge: HOME | End: 2023-03-22
Attending: EMERGENCY MEDICINE
Payer: COMMERCIAL

## 2023-03-21 DIAGNOSIS — R45.851 SUICIDAL IDEATION: ICD-10-CM

## 2023-03-21 DIAGNOSIS — F10.10 ALCOHOL ABUSE: ICD-10-CM

## 2023-03-21 DIAGNOSIS — F32.A DEPRESSION, UNSPECIFIED DEPRESSION TYPE: Primary | ICD-10-CM

## 2023-03-21 DIAGNOSIS — F10.920 ALCOHOLIC INTOXICATION WITHOUT COMPLICATION (HCC): ICD-10-CM

## 2023-03-21 LAB
ALBUMIN SERPL-MCNC: 3.3 G/DL (ref 3.4–5)
ALBUMIN/GLOB SERPL: 0.8 {RATIO} (ref 1–2)
ALP LIVER SERPL-CCNC: 81 U/L
ALT SERPL-CCNC: 37 U/L
AMPHET UR QL SCN: NEGATIVE
ANION GAP SERPL CALC-SCNC: 9 MMOL/L (ref 0–18)
APAP SERPL-MCNC: <2 UG/ML (ref 10–30)
AST SERPL-CCNC: 30 U/L (ref 15–37)
BASOPHILS # BLD AUTO: 0.06 X10(3) UL (ref 0–0.2)
BASOPHILS NFR BLD AUTO: 0.7 %
BENZODIAZ UR QL SCN: NEGATIVE
BILIRUB SERPL-MCNC: 0.3 MG/DL (ref 0.1–2)
BILIRUB UR QL STRIP.AUTO: NEGATIVE
BUN BLD-MCNC: 17 MG/DL (ref 7–18)
CALCIUM BLD-MCNC: 9.1 MG/DL (ref 8.5–10.1)
CANNABINOIDS UR QL SCN: NEGATIVE
CHLORIDE SERPL-SCNC: 106 MMOL/L (ref 98–112)
CLARITY UR REFRACT.AUTO: CLEAR
CO2 SERPL-SCNC: 25 MMOL/L (ref 21–32)
COCAINE UR QL: NEGATIVE
COLOR UR AUTO: COLORLESS
CREAT BLD-MCNC: 1.15 MG/DL
CREAT UR-SCNC: 15.8 MG/DL
EOSINOPHIL # BLD AUTO: 0.08 X10(3) UL (ref 0–0.7)
EOSINOPHIL NFR BLD AUTO: 0.9 %
ERYTHROCYTE [DISTWIDTH] IN BLOOD BY AUTOMATED COUNT: 13.6 %
ETHANOL SERPL-MCNC: 188 MG/DL (ref ?–3)
GFR SERPLBLD BASED ON 1.73 SQ M-ARVRAT: 73 ML/MIN/1.73M2 (ref 60–?)
GLOBULIN PLAS-MCNC: 4.3 G/DL (ref 2.8–4.4)
GLUCOSE BLD-MCNC: 123 MG/DL (ref 70–99)
GLUCOSE UR STRIP.AUTO-MCNC: NEGATIVE MG/DL
HCT VFR BLD AUTO: 42.5 %
HGB BLD-MCNC: 13.3 G/DL
IMM GRANULOCYTES # BLD AUTO: 0.05 X10(3) UL (ref 0–1)
IMM GRANULOCYTES NFR BLD: 0.6 %
KETONES UR STRIP.AUTO-MCNC: NEGATIVE MG/DL
LEUKOCYTE ESTERASE UR QL STRIP.AUTO: NEGATIVE
LYMPHOCYTES # BLD AUTO: 1.38 X10(3) UL (ref 1–4)
LYMPHOCYTES NFR BLD AUTO: 16.2 %
MCH RBC QN AUTO: 26.3 PG (ref 26–34)
MCHC RBC AUTO-ENTMCNC: 31.3 G/DL (ref 31–37)
MCV RBC AUTO: 84.2 FL
MDMA UR QL SCN: NEGATIVE
MONOCYTES # BLD AUTO: 0.68 X10(3) UL (ref 0.1–1)
MONOCYTES NFR BLD AUTO: 8 %
NEUTROPHILS # BLD AUTO: 6.29 X10 (3) UL (ref 1.5–7.7)
NEUTROPHILS # BLD AUTO: 6.29 X10(3) UL (ref 1.5–7.7)
NEUTROPHILS NFR BLD AUTO: 73.6 %
NITRITE UR QL STRIP.AUTO: NEGATIVE
OPIATES UR QL SCN: NEGATIVE
OSMOLALITY SERPL CALC.SUM OF ELEC: 293 MOSM/KG (ref 275–295)
OXYCODONE UR QL SCN: NEGATIVE
PH UR STRIP.AUTO: 6 [PH] (ref 5–8)
PLATELET # BLD AUTO: 247 10(3)UL (ref 150–450)
POTASSIUM SERPL-SCNC: 3.6 MMOL/L (ref 3.5–5.1)
PROT SERPL-MCNC: 7.6 G/DL (ref 6.4–8.2)
PROT UR STRIP.AUTO-MCNC: NEGATIVE MG/DL
RBC # BLD AUTO: 5.05 X10(6)UL
RBC UR QL AUTO: NEGATIVE
SALICYLATES SERPL-MCNC: <1.7 MG/DL (ref 2.8–20)
SARS-COV-2 RNA RESP QL NAA+PROBE: NOT DETECTED
SODIUM SERPL-SCNC: 140 MMOL/L (ref 136–145)
SP GR UR STRIP.AUTO: 1 (ref 1–1.03)
UROBILINOGEN UR STRIP.AUTO-MCNC: <2 MG/DL
WBC # BLD AUTO: 8.5 X10(3) UL (ref 4–11)

## 2023-03-21 PROCEDURE — 80143 DRUG ASSAY ACETAMINOPHEN: CPT | Performed by: EMERGENCY MEDICINE

## 2023-03-21 PROCEDURE — 80179 DRUG ASSAY SALICYLATE: CPT

## 2023-03-21 PROCEDURE — 80179 DRUG ASSAY SALICYLATE: CPT | Performed by: EMERGENCY MEDICINE

## 2023-03-21 PROCEDURE — 80307 DRUG TEST PRSMV CHEM ANLYZR: CPT

## 2023-03-21 PROCEDURE — 82077 ASSAY SPEC XCP UR&BREATH IA: CPT

## 2023-03-21 PROCEDURE — 85025 COMPLETE CBC W/AUTO DIFF WBC: CPT | Performed by: EMERGENCY MEDICINE

## 2023-03-21 PROCEDURE — 36415 COLL VENOUS BLD VENIPUNCTURE: CPT

## 2023-03-21 PROCEDURE — 80053 COMPREHEN METABOLIC PANEL: CPT | Performed by: EMERGENCY MEDICINE

## 2023-03-21 PROCEDURE — 80307 DRUG TEST PRSMV CHEM ANLYZR: CPT | Performed by: EMERGENCY MEDICINE

## 2023-03-21 PROCEDURE — 99285 EMERGENCY DEPT VISIT HI MDM: CPT

## 2023-03-21 PROCEDURE — 82077 ASSAY SPEC XCP UR&BREATH IA: CPT | Performed by: EMERGENCY MEDICINE

## 2023-03-21 PROCEDURE — 80143 DRUG ASSAY ACETAMINOPHEN: CPT

## 2023-03-21 PROCEDURE — 80053 COMPREHEN METABOLIC PANEL: CPT

## 2023-03-21 PROCEDURE — 81003 URINALYSIS AUTO W/O SCOPE: CPT | Performed by: EMERGENCY MEDICINE

## 2023-03-21 PROCEDURE — 85025 COMPLETE CBC W/AUTO DIFF WBC: CPT

## 2023-03-21 RX ORDER — ONDANSETRON 4 MG/1
4 TABLET, ORALLY DISINTEGRATING ORAL ONCE
Status: COMPLETED | OUTPATIENT
Start: 2023-03-21 | End: 2023-03-21

## 2023-03-22 VITALS
BODY MASS INDEX: 45.47 KG/M2 | RESPIRATION RATE: 22 BRPM | OXYGEN SATURATION: 93 % | DIASTOLIC BLOOD PRESSURE: 80 MMHG | HEIGHT: 68 IN | HEART RATE: 70 BPM | TEMPERATURE: 97 F | WEIGHT: 300 LBS | SYSTOLIC BLOOD PRESSURE: 149 MMHG

## 2023-03-22 NOTE — ED PROVIDER NOTES
Patient evaluated by [facility]. Patient is sober and denies any suicidal ideation. Patient's wife was contacted and reports that last night was the first time he is ever made a suicidal statement and he was intoxicated. Does not meet criteria for involuntary admission. We did make appointments for him to get some mental health assistance. He currently says that he will follow-up. Discharged home at this time and instructed to return with suicidal ideation, or with any concerns.

## 2023-03-22 NOTE — ED QUICK NOTES
Rounding Completed    Plan of Care reviewed. Waiting for [facility] assessment. Elimination needs assessed. Took vital signs. Bed is locked and in lowest position. Call light within reach. Sitter at bedside.

## 2023-03-22 NOTE — BH LEVEL OF CARE ASSESSMENT
Crisis Evaluation Assessment    Deng [facility] YOB: 1965   Age 59year old MRN [id number]   Location [address] [facility] Attending Karen Araya MD      Patient's legal sex: male  Patient identifies as: male  Patient's birth sex: male  Preferred pronouns: he/him    Date of Service: 3/22/2023    Referral Source:        Reason for Crisis Evaluation   Patient made statements to his wife last night about ending his life by taking Xanax. Collateral  Attempted to call the patient's wife but received no answer. however she did complete a petition which states that the patient sent a text that said \"I have enough Xanax to end it all\" \"I am done\" \"I have lived a full life\" \"I am tired of living\". The petition also states that the patient hits himself but provides no further description. Per wife: pt has a Hx of depression. Last few weeks he has been increasing ETOH use, drinking basically from the moment he's awake to the moment he goes to bed. Has made statements about \"wanting to end it all. \" Made a statement last night, \"I've got enough xanax to end it all. \" This was the only active statement he has made, and all other statements have been passive: \"I want to go to sleep and not wake up,\" \"I would be better off dead,\" etc. Pt is prescribed lexapro which doesn't seem to be helping the depression. Lexapro and xanax come from his PCP. Pt does not see any mental health professionals, has ignored wife's encouragements to pursue treatment. Pt seems to be more stressed out than usual at his job. Wife's primary concern is the alcohol: \"he seems to drink more and more every day. \"       Risk to Self or Others  Patient denies HI, violence, property destruction, aggression, psychosis. Suicide Risk Assessments:    Source of information for CSSR: Patient  In what setting is the screener performed?: in person  1.  Have you wished you were dead or wished you could go to sleep and not wake up? (past 30 days): Yes  2. Have you actually had any thoughts of killing yourself? (past 30 days): Yes  3. Have you been thinking about how you might kill yourself? (past 30 days): Yes  4. Have you had these thoughts and had some intention of acting on them? (past 30 days): No  5a. Have you started to work out or worked out the details of how to kill yourself? (past 30 days): No  5b. Do you intend to carry out this plan? (past 30 days): No  6. Have you ever done anything, started to do anything, or prepared to do anything to end your life? (lifetime): No     Score - BH OV: 4 - Medium Risk   Describe : Pt states that lately he has been depressed and has thought about not waking up in the morning. Pt attributes these thoughts in large part to chronic pain, which in turn contributes to depression. Pt made a statement last night via text to his wife that said, \"I have enough xanax to end it all. \" Pt states he was intoxicated at the time and that he \"didn't mean\" what he said. Note that the patient had the bottle of Xanax with him at the time that he sent those texts. Is your experience of thoughts of dying by suicide: A Coping Strategy  Protective Factors: Family, work  Past Suicidal Ideation: Denies            Family History or Personal Lived Experience of Loss or Near Loss by Suicide: Denies          Non-Suicidal Self-Injury:   Per the petition the patient hits himself. However, the patient denies this. Access to Means:  Access to Means  Has access to means to attempt suicide or harm others or property: Yes  Description of Access: Pt has access to medications including xanax  Discussion of Removal of Access to Means: Will be part of discharge planning  Access to Firearm/Weapon: No  Do you have a firearm owner ID card?: No    Protective Factors:   Protective Factors: Family, work    Review of Psychiatric Systems:  Depression: Patient endorses depressed mood, crying spells, feeling hopeless, SI.   Anxiety: Patient denies  Psychosis: Patient denies A/VH, delusions, lizzie. Sleep: Patient states that he sleeps fine as long as he is able to use his CPAP machine  Appetite: Patient states that sometimes his appetite is up other times it is down. Substance Use:  Patient endorses daily use of alcohol, typically vodka. Upon arrival patient's BAL was 188. Patient states that he drank half a handle of vodka last night. Patient denies withdrawal symptoms. Patient denies other consequences of his use. Patient states that drinking alcohol helps to alleviate the pain in his hip and knee. Patient denies history of substance use treatment. Functional Achievement:   Patient is a  at [employer], denies functional impairment. Patient denies functional impairment at home as well. Patient does state however that his function is limited by the hip and knee pain that he experiences. Current Treatment and Treatment History:  Patient denies current treatment except a Xanax prescription from his PCP. Patient denies history of inpatient/PHP/IOP. Relevant Social History:  Patient lives with his wife, son, and daughter. Patient states that he often feels as though his family is \"against\" him, or that they \"gang up on\" him. Patient denies abuse/trauma history, legal history. Jose Roberto and Complex (as applicable):               Mobility/Activity & Assistive Devices  Current/recent injuries or surgeries that affect mobility?: Yes (Comment) (Pt has chronic knee and hip pain that require him to frequently switch from standing to sitting and back again.)  History of falls?: Yes  When was the most recent fall?: 6-7 months ago  How often has the patient fallen in the last month?: 0        Special Considerations  Does the patient need a BiPAP or CPAP?: Yes, will provide their own            EDP Assessment (as applicable):  IBW Calculations  Weight: 300 lb  BMI (Calculated): 45.6  IBW LBS [place]: 154 LBS  IBW %: 194.81 %  IBW + 10%: 169.4 LBS  IBW - 10%: 138.6 LBS                                                                    Abuse Assessment:  Abuse Assessment  Physical Abuse: Denies  Verbal Abuse: Denies  Sexual Abuse: Denies  Neglect: Denies  Does anyone say or do something to you that makes you feel unsafe?: No  Have You Ever Been Harmed by a Partner/Caregiver?: No  Health Concerns r/t Abuse: No  Possible Abuse Reportable to[de-identified] Not appropriate for reporting to authorities    Mental Status Exam:   General Appearance  Characteristics: Appropriate clothing  Eye Contact: Direct  Psychomotor Behavior  Abnormal movements: None  Posture: Relaxed  Rate of Movement: Normal  Mood and Affect  Mood or Feelings: Calm  Appropriateness of Affect: Congruent to mood; Appropriate to situation  Range of Affect: Normal  Stability of Affect: Stable  Attitude toward staff: Co-operative  Speech  Rate of Speech: Appropriate  Flow of Speech: Appropriate  Intensity of Volume: Ordinary  Clarity: Clear  Cognition  Concentration: Unimpaired  Memory: Recent memory intact; Remote memory intact  Orientation Level: Oriented X4  Insight: Fair  Judgment: Poor  Thought Patterns  Clarity/Relevance: Coherent;Logical;Relevant to topic  Flow: Organized  Content: Ordinary  Level of Consciousness: Alert  Level of Consciousness: Alert  Behavior  Exhibited behavior: Appropriate to situation      Disposition:    Assessment Summary:   38-year-old male presents to the emergency department after making SI statements. Last night patient texted his wife that he has enough Xanax to end it all, that he is done, that he has lived a full life, that he is tired of living, etc.  Patient does endorse passive SI and agrees that he made the statements last night, however he denies any intention of acting on the thoughts. C-SSRS equals 4. Patient endorses depressive symptoms that impair function in terms of appetite.   Patient endorses daily alcohol use, typically vodka. Last use was last night when he drank half a handle of vodka. BAL was 188 upon arrival.  Patient denies withdrawal symptoms. Patient endorses chronic hip and knee pain that exacerbate his depressive symptoms. Patient denies SIB, risk to others, psychosis, cognitive impairment, developmental delay, functional impairment, current treatment, disordered eating.       Risk/Protective Factors  Protective Factors: Family, work    Level of Care Recommendations  Consulted with: Dr Milena Mas  Level of Care Recommendation: Inpatient Acute Care  Unit: [facility]  Reason for Unit Assigned: Age/Sx  Inpatient Criteria: Suicidal/homicidal risk  Behavioral Precautions: Suicide  Medical Precautions: None           Diagnoses:  Primary Psychiatric Diagnosis  MDD, single episode, severe     Secondary Psychiatric Diagnoses  Pending  Pervasive Diagnoses  Pending  Pertinent Non-Psychiatric Diagnoses  Pending        Kevon Dominguez

## 2023-03-22 NOTE — ED INITIAL ASSESSMENT (HPI)
Per wife, Prabha Quintero has been talking about suicide, I have Xanax and I'm going do it all in.\" States this has been going for a while, \"more so today. \" Pt admits SI, \"I'm in pain all the time, I have a bad hip and nobody helps me because I'm too fat. \" Pt tearful, crying. Admits ETOH, \"going on for years. \" Last drink PTA. Per wife, \"drinks 3-4 big bottles of Loco a day with 1/2 bottle of the tall bottle of vodka. \" Pt denies hallucinations. He denies illicit drug use. Pt denies HI.  States he shakes when he stops drinking, no prior hx of Inpatient detox, no hx Seizures

## 2023-03-22 NOTE — ED NOTES
Writer met with pt, introducing self and role. Pt presents as calm and cooperative, resting on recliner in Mpod. Sitter present. Writer explained and offered available unit resources, including DBT workbook material. Pt declined at this time. Advised to notify staff if in need of anything. Writer to check back with pt.

## 2023-03-22 NOTE — ED QUICK NOTES
ROR form completed placed on chart and copy placed with belonging.  Wife Warner Jarvis working on completion of petition

## 2023-03-22 NOTE — PROGRESS NOTES
03/22/23 0700   COVID Exposure Risk Screening   Do you have any of the following new or worsening symptoms of COVID-19? None   Have you been diagnosed with COVID-19 within the past 10 days? No   Are you awaiting COVID-19 test results or do you have a COVID-19 test scheduled? No   In the past 10 days, have you been in contact with someone who was confirmed or suspected to have COVID-19?  No

## 2023-03-22 NOTE — DISCHARGE INSTRUCTIONS
You have been scheduled an appointment with our [facility]. The purpose of the [facility] is to connect people with providers more expediently than traditional scheduling methods. You have been scheduled for two appointments. The first is on April 11, 2023 at 9:00 AM with Matias Mccray. This will be your initial appointment, so you will not see Matias Mccray on an ongoing basis. The second appointment is on June 28, 2023 at 9:00 AM with Monalisa Gray. This will be your regular provider, so you will see Monalisa Gray on an ongoing basis. Both of these appointments are virtual and instructions have been sent to your email address on file. If you have any questions about these appointments, please call [phone]. If there are any emergency situations or safety concerns please call 911 or go to the nearest emergency department.

## 2023-03-27 DIAGNOSIS — E66.01 CLASS 3 SEVERE OBESITY WITH SERIOUS COMORBIDITY AND BODY MASS INDEX (BMI) OF 45.0 TO 49.9 IN ADULT, UNSPECIFIED OBESITY TYPE (HCC): ICD-10-CM

## 2023-03-28 ENCOUNTER — TELEPHONE (OUTPATIENT)
Dept: FAMILY MEDICINE CLINIC | Facility: CLINIC | Age: 58
End: 2023-03-28

## 2023-03-28 RX ORDER — ALBUTEROL SULFATE 90 UG/1
AEROSOL, METERED RESPIRATORY (INHALATION)
Qty: 8.5 EACH | Refills: 1 | Status: SHIPPED | OUTPATIENT
Start: 2023-03-28

## 2023-03-28 RX ORDER — PHENTERMINE HYDROCHLORIDE 37.5 MG/1
TABLET ORAL
Qty: 30 TABLET | Refills: 0 | Status: SHIPPED | OUTPATIENT
Start: 2023-03-28

## 2023-03-28 NOTE — TELEPHONE ENCOUNTER
I think seeing a primary is ideal, but I think on 4/6/23, Dr. Sha Haile is already overbooked, so I guess see me on 4/5/23. Ideally avoid double booking with a physical or new patient.  But ok otherwise      Marce Cosby MD

## 2023-03-28 NOTE — TELEPHONE ENCOUNTER
Dr. Selvin Matute,  Would you add on to do pre-op on 4/5? Or wait until Odessia Burkitt is back 4/6?

## 2023-03-28 NOTE — TELEPHONE ENCOUNTER
NEEDS SURGERY ON HIP AND SCHED FOR  4-13-23. THEY NEED A PRE SURGICAL VISIT THIS WEEK. .PER THE WIFE. NOTHING AVAILABLE. CAN WE SQUEEZE HIM IN WITH DR Vidal Oliva? PLS CALL WIFE  BACK TO [facility]VISE.

## 2023-03-28 NOTE — TELEPHONE ENCOUNTER
Spoke with [employer]. Jacki Goldmann for 4/5/2023 with [place]. Dayan verbalized understanding.

## 2023-03-29 DIAGNOSIS — F41.9 ANXIETY DISORDER, UNSPECIFIED: ICD-10-CM

## 2023-03-29 RX ORDER — ESCITALOPRAM OXALATE 20 MG/1
TABLET ORAL
Qty: 90 TABLET | Refills: 1 | Status: SHIPPED | OUTPATIENT
Start: 2023-03-29

## 2023-04-05 ENCOUNTER — LAB ENCOUNTER (OUTPATIENT)
Dept: LAB | Age: 58
End: 2023-04-05
Attending: FAMILY MEDICINE
Payer: COMMERCIAL

## 2023-04-05 ENCOUNTER — OFFICE VISIT (OUTPATIENT)
Dept: FAMILY MEDICINE CLINIC | Facility: CLINIC | Age: 58
End: 2023-04-05
Payer: COMMERCIAL

## 2023-04-05 VITALS
BODY MASS INDEX: 44.86 KG/M2 | RESPIRATION RATE: 18 BRPM | OXYGEN SATURATION: 98 % | HEIGHT: 68 IN | DIASTOLIC BLOOD PRESSURE: 78 MMHG | SYSTOLIC BLOOD PRESSURE: 144 MMHG | WEIGHT: 296 LBS | HEART RATE: 86 BPM

## 2023-04-05 DIAGNOSIS — M25.552 LEFT HIP PAIN: ICD-10-CM

## 2023-04-05 DIAGNOSIS — Z01.818 PREOP EXAMINATION: Primary | ICD-10-CM

## 2023-04-05 DIAGNOSIS — R73.03 PREDIABETES: ICD-10-CM

## 2023-04-05 DIAGNOSIS — M16.12 OSTEOARTHRITIS OF LEFT HIP, UNSPECIFIED OSTEOARTHRITIS TYPE: ICD-10-CM

## 2023-04-05 DIAGNOSIS — I10 ESSENTIAL HYPERTENSION: Chronic | ICD-10-CM

## 2023-04-05 DIAGNOSIS — Z01.818 PREOP EXAMINATION: ICD-10-CM

## 2023-04-05 PROBLEM — N18.9 ACUTE RENAL FAILURE SUPERIMPOSED ON CHRONIC KIDNEY DISEASE, UNSPECIFIED CKD STAGE, UNSPECIFIED ACUTE RENAL FAILURE TYPE: Status: RESOLVED | Noted: 2022-08-28 | Resolved: 2023-04-05

## 2023-04-05 PROBLEM — N17.9 ACUTE RENAL FAILURE SUPERIMPOSED ON CHRONIC KIDNEY DISEASE, UNSPECIFIED CKD STAGE, UNSPECIFIED ACUTE RENAL FAILURE TYPE (HCC): Status: RESOLVED | Noted: 2022-08-28 | Resolved: 2023-04-05

## 2023-04-05 PROBLEM — N17.9 AKI (ACUTE KIDNEY INJURY) (HCC): Status: RESOLVED | Noted: 2021-08-30 | Resolved: 2023-04-05

## 2023-04-05 PROBLEM — N18.9 ACUTE RENAL FAILURE SUPERIMPOSED ON CHRONIC KIDNEY DISEASE, UNSPECIFIED CKD STAGE, UNSPECIFIED ACUTE RENAL FAILURE TYPE (HCC): Status: RESOLVED | Noted: 2022-08-28 | Resolved: 2023-04-05

## 2023-04-05 PROBLEM — J18.9 COMMUNITY ACQUIRED PNEUMONIA OF RIGHT LUNG, UNSPECIFIED PART OF LUNG: Status: RESOLVED | Noted: 2021-08-30 | Resolved: 2023-04-05

## 2023-04-05 PROBLEM — J96.01 ACUTE HYPOXEMIC RESPIRATORY FAILURE (HCC): Status: RESOLVED | Noted: 2021-08-30 | Resolved: 2023-04-05

## 2023-04-05 PROBLEM — N17.9 ACUTE RENAL FAILURE SUPERIMPOSED ON CHRONIC KIDNEY DISEASE, UNSPECIFIED CKD STAGE, UNSPECIFIED ACUTE RENAL FAILURE TYPE: Status: RESOLVED | Noted: 2022-08-28 | Resolved: 2023-04-05

## 2023-04-05 LAB
ALBUMIN SERPL-MCNC: 3.3 G/DL (ref 3.4–5)
ALBUMIN/GLOB SERPL: 0.8 {RATIO} (ref 1–2)
ALP LIVER SERPL-CCNC: 88 U/L
ALT SERPL-CCNC: 34 U/L
ANION GAP SERPL CALC-SCNC: 6 MMOL/L (ref 0–18)
AST SERPL-CCNC: 22 U/L (ref 15–37)
ATRIAL RATE: 78 BPM
BASOPHILS # BLD AUTO: 0.05 X10(3) UL (ref 0–0.2)
BASOPHILS NFR BLD AUTO: 0.6 %
BILIRUB SERPL-MCNC: 0.4 MG/DL (ref 0.1–2)
BUN BLD-MCNC: 19 MG/DL (ref 7–18)
CALCIUM BLD-MCNC: 9.5 MG/DL (ref 8.5–10.1)
CHLORIDE SERPL-SCNC: 100 MMOL/L (ref 98–112)
CO2 SERPL-SCNC: 29 MMOL/L (ref 21–32)
CREAT BLD-MCNC: 1.34 MG/DL
EOSINOPHIL # BLD AUTO: 0.05 X10(3) UL (ref 0–0.7)
EOSINOPHIL NFR BLD AUTO: 0.6 %
ERYTHROCYTE [DISTWIDTH] IN BLOOD BY AUTOMATED COUNT: 13.8 %
FASTING STATUS PATIENT QL REPORTED: NO
GFR SERPLBLD BASED ON 1.73 SQ M-ARVRAT: 61 ML/MIN/1.73M2 (ref 60–?)
GLOBULIN PLAS-MCNC: 4.3 G/DL (ref 2.8–4.4)
GLUCOSE BLD-MCNC: 115 MG/DL (ref 70–99)
HCT VFR BLD AUTO: 42.5 %
HGB BLD-MCNC: 13.2 G/DL
IMM GRANULOCYTES # BLD AUTO: 0.04 X10(3) UL (ref 0–1)
IMM GRANULOCYTES NFR BLD: 0.5 %
LYMPHOCYTES # BLD AUTO: 1.13 X10(3) UL (ref 1–4)
LYMPHOCYTES NFR BLD AUTO: 13.2 %
MCH RBC QN AUTO: 26.6 PG (ref 26–34)
MCHC RBC AUTO-ENTMCNC: 31.1 G/DL (ref 31–37)
MCV RBC AUTO: 85.7 FL
MONOCYTES # BLD AUTO: 0.79 X10(3) UL (ref 0.1–1)
MONOCYTES NFR BLD AUTO: 9.2 %
NEUTROPHILS # BLD AUTO: 6.53 X10 (3) UL (ref 1.5–7.7)
NEUTROPHILS # BLD AUTO: 6.53 X10(3) UL (ref 1.5–7.7)
NEUTROPHILS NFR BLD AUTO: 75.9 %
OSMOLALITY SERPL CALC.SUM OF ELEC: 283 MOSM/KG (ref 275–295)
P AXIS: 55 DEGREES
P-R INTERVAL: 180 MS
PLATELET # BLD AUTO: 295 10(3)UL (ref 150–450)
POTASSIUM SERPL-SCNC: 4 MMOL/L (ref 3.5–5.1)
PROT SERPL-MCNC: 7.6 G/DL (ref 6.4–8.2)
Q-T INTERVAL: 418 MS
QRS DURATION: 142 MS
QTC CALCULATION (BEZET): 476 MS
R AXIS: 37 DEGREES
RBC # BLD AUTO: 4.96 X10(6)UL
SODIUM SERPL-SCNC: 135 MMOL/L (ref 136–145)
T AXIS: 31 DEGREES
VENTRICULAR RATE: 78 BPM
WBC # BLD AUTO: 8.6 X10(3) UL (ref 4–11)

## 2023-04-05 PROCEDURE — 3008F BODY MASS INDEX DOCD: CPT | Performed by: FAMILY MEDICINE

## 2023-04-05 PROCEDURE — 3077F SYST BP >= 140 MM HG: CPT | Performed by: FAMILY MEDICINE

## 2023-04-05 PROCEDURE — 3078F DIAST BP <80 MM HG: CPT | Performed by: FAMILY MEDICINE

## 2023-04-05 PROCEDURE — 85025 COMPLETE CBC W/AUTO DIFF WBC: CPT | Performed by: FAMILY MEDICINE

## 2023-04-05 PROCEDURE — 99213 OFFICE O/P EST LOW 20 MIN: CPT | Performed by: FAMILY MEDICINE

## 2023-04-05 PROCEDURE — 93000 ELECTROCARDIOGRAM COMPLETE: CPT | Performed by: FAMILY MEDICINE

## 2023-04-05 PROCEDURE — 80053 COMPREHEN METABOLIC PANEL: CPT | Performed by: FAMILY MEDICINE

## 2023-04-05 RX ORDER — ACETAMINOPHEN AND CODEINE PHOSPHATE 60; 300 MG/1; MG/1
TABLET ORAL
COMMUNITY
Start: 2023-04-04

## 2023-04-11 PROBLEM — F33.2 MAJOR DEPRESSIVE DISORDER, RECURRENT SEVERE WITHOUT PSYCHOTIC FEATURES (HCC): Status: ACTIVE | Noted: 2023-04-11

## 2023-04-11 PROBLEM — F41.9 ANXIETY DISORDER: Status: ACTIVE | Noted: 2018-04-27

## 2023-04-11 PROBLEM — F10.90 ALCOHOL USE DISORDER: Status: ACTIVE | Noted: 2023-04-11

## 2023-04-12 DIAGNOSIS — M25.552 LEFT HIP PAIN: ICD-10-CM

## 2023-04-12 RX ORDER — MELOXICAM 15 MG/1
15 TABLET ORAL DAILY
Qty: 30 TABLET | Refills: 1 | Status: SHIPPED | OUTPATIENT
Start: 2023-04-12 | End: 2024-04-06

## 2023-04-13 ENCOUNTER — LAB ENCOUNTER (OUTPATIENT)
Dept: LAB | Age: 58
End: 2023-04-13
Attending: FAMILY MEDICINE
Payer: COMMERCIAL

## 2023-04-13 DIAGNOSIS — Z01.818 PREOP EXAMINATION: ICD-10-CM

## 2023-04-13 PROCEDURE — 87081 CULTURE SCREEN ONLY: CPT

## 2023-04-18 ENCOUNTER — TELEPHONE (OUTPATIENT)
Dept: FAMILY MEDICINE CLINIC | Facility: CLINIC | Age: 58
End: 2023-04-18

## 2023-04-18 DIAGNOSIS — Z22.322 MRSA (METHICILLIN RESISTANT STAPH AUREUS) CULTURE POSITIVE: Primary | ICD-10-CM

## 2023-04-18 DIAGNOSIS — Z22.322 MRSA (METHICILLIN RESISTANT STAPH AUREUS) CULTURE POSITIVE: ICD-10-CM

## 2023-07-18 RX ORDER — OMEPRAZOLE 40 MG/1
40 CAPSULE, DELAYED RELEASE ORAL DAILY
Qty: 90 CAPSULE | Refills: 0 | Status: SHIPPED | OUTPATIENT
Start: 2023-07-18

## 2023-11-17 DIAGNOSIS — F41.9 ANXIETY DISORDER, UNSPECIFIED: ICD-10-CM

## 2023-11-17 DIAGNOSIS — I10 PRIMARY HYPERTENSION: ICD-10-CM

## 2023-11-17 RX ORDER — TAMSULOSIN HYDROCHLORIDE 0.4 MG/1
0.4 CAPSULE ORAL DAILY
Qty: 30 CAPSULE | Refills: 0 | Status: SHIPPED | OUTPATIENT
Start: 2023-11-17

## 2023-11-17 RX ORDER — ESCITALOPRAM OXALATE 20 MG/1
20 TABLET ORAL DAILY
Qty: 30 TABLET | Refills: 0 | Status: SHIPPED | OUTPATIENT
Start: 2023-11-17

## 2023-11-17 RX ORDER — OMEPRAZOLE 40 MG/1
40 CAPSULE, DELAYED RELEASE ORAL DAILY
Qty: 30 CAPSULE | Refills: 0 | Status: SHIPPED | OUTPATIENT
Start: 2023-11-17

## 2023-11-17 RX ORDER — ALPRAZOLAM 0.5 MG/1
0.5 TABLET ORAL 2 TIMES DAILY PRN
Qty: 24 TABLET | Refills: 0 | Status: SHIPPED | OUTPATIENT
Start: 2023-11-17

## 2023-11-17 RX ORDER — MELOXICAM 15 MG/1
15 TABLET ORAL DAILY
Qty: 30 TABLET | Refills: 0 | Status: SHIPPED | OUTPATIENT
Start: 2023-11-17

## 2023-11-17 RX ORDER — TRAZODONE HYDROCHLORIDE 50 MG/1
TABLET ORAL NIGHTLY
Qty: 60 TABLET | Refills: 0 | Status: SHIPPED | OUTPATIENT
Start: 2023-11-17

## 2023-11-17 RX ORDER — DILTIAZEM HYDROCHLORIDE 120 MG/1
120 CAPSULE, EXTENDED RELEASE ORAL DAILY
Qty: 30 CAPSULE | Refills: 0 | Status: SHIPPED | OUTPATIENT
Start: 2023-11-17

## 2023-11-17 NOTE — TELEPHONE ENCOUNTER
Last OV: 4/5/23  No future appt. Medication Quantity Refills Start End   lisinopril-hydroCHLOROthiazide 20-12.5 MG Oral Tab (Discontinued) 90 tablet 1 3/29/2022 8/30/2022   Sig:   Take 1 tablet by mouth daily. Route:   Oral     Reason for Discontinue:   Stop Taking at Discharge       YP, please see request for refills to be sent to [place],   please review pended medication. Do you want the lisinopril-hydrochlorothiazide sent too? It shows discontinued in [facility].     Approve/deny:

## 2023-11-17 NOTE — TELEPHONE ENCOUNTER
Lisinopril-Hydrochlorothiazide 20-25 MG Oral Tab     alprazolam (XANAX) 0.25 MG Oral Tab     tamsulosin (Flomax) cap 0.4 mg     Phentermine HCl (ADIPEX-P) tab 37.5 mg      dilTIAZem HCl ER Beads (TIADYLT ER) 240 MG Oral Capsule SR 24 Hr     ESCITALOPRAM 20 MG Oral Tab     OMEPRAZOLE 40 MG Oral Capsule Delayed Release     MELOXICAM 15 MG Oral Tab     traZODone 50 MG Oral Tab       [facility] [address], [place] [address] [phone], [phone]     Patient would like these for 30 day supply. He needs them as he is in [place] right now.

## 2023-12-21 DIAGNOSIS — I10 PRIMARY HYPERTENSION: ICD-10-CM

## 2023-12-22 NOTE — TELEPHONE ENCOUNTER
A refill request was received for:  Requested Prescriptions     Pending Prescriptions Disp Refills    DILT- MG Oral Capsule SR 24 Hr [Pharmacy Med Name: Dilt- MG Oral Capsule Extended Release 24 Hour] 30 capsule 0     Sig: Take 1 capsule by mouth once daily    TAMSULOSIN 0.4 MG Oral Cap [Pharmacy Med Name: Tamsulosin HCl 0.4 MG Oral Capsule] 30 capsule 0     Sig: Take 1 capsule by mouth once daily       Last refill date:   11-17-23    Last office visit: 2-2-23    Follow up due:  No future appointments.

## 2023-12-23 RX ORDER — TAMSULOSIN HYDROCHLORIDE 0.4 MG/1
0.4 CAPSULE ORAL DAILY
Qty: 30 CAPSULE | Refills: 0 | Status: SHIPPED | OUTPATIENT
Start: 2023-12-23

## 2023-12-23 RX ORDER — DILTIAZEM HYDROCHLORIDE 120 MG/1
120 CAPSULE, EXTENDED RELEASE ORAL DAILY
Qty: 30 CAPSULE | Refills: 0 | Status: SHIPPED | OUTPATIENT
Start: 2023-12-23

## 2024-01-09 ENCOUNTER — PATIENT MESSAGE (OUTPATIENT)
Dept: FAMILY MEDICINE CLINIC | Facility: CLINIC | Age: 59
End: 2024-01-09

## 2024-01-09 NOTE — TELEPHONE ENCOUNTER
From: Nick Munguia  To: Junior Vásquez  Sent: 1/9/2024 2:27 AM CST  Subject: My History    Dr. Vásquez, I need your help. Many things have changed since I have last seen you. Currently, I am involved in a case where I have been accused of a crime. Can you send me a letter stating how I was in your care as my doctor? Specifically, my acute pain in my left hip and the medications you prescribed me, Hydrocodone for pain and Xanax for my anxiety. Specifically, I need a doctor's letter confirming your care from 1/1/2023 to 6/1/2023.    Thank you Doctor

## 2024-01-23 ENCOUNTER — TELEPHONE (OUTPATIENT)
Dept: FAMILY MEDICINE CLINIC | Facility: CLINIC | Age: 59
End: 2024-01-23

## 2024-01-23 NOTE — TELEPHONE ENCOUNTER
Sent mess about needing a note thru My Chart.  Pt now lives in [place] and can not for appt.    He can not do Video Visit because he is out of state.    He needs this by Feb 3rd 2024.    I Spoke to the Sister and she would like to let Dr Vásquez know this is for a legal matter.    See My Chart note.

## (undated) DIAGNOSIS — M17.10 OSTEOARTHROSIS, LOCALIZED, PRIMARY, KNEE: ICD-10-CM

## (undated) DIAGNOSIS — M25.561 KNEE PAIN, BILATERAL: ICD-10-CM

## (undated) DIAGNOSIS — F32.A DEPRESSION, UNSPECIFIED DEPRESSION TYPE: ICD-10-CM

## (undated) DIAGNOSIS — G59 MONONEUROPATHY DUE TO UNDERLYING DISEASE: ICD-10-CM

## (undated) DIAGNOSIS — I10 ESSENTIAL HYPERTENSION: ICD-10-CM

## (undated) DIAGNOSIS — F41.9 ANXIETY: ICD-10-CM

## (undated) DIAGNOSIS — I10 ESSENTIAL (PRIMARY) HYPERTENSION: ICD-10-CM

## (undated) DIAGNOSIS — M25.562 KNEE PAIN, BILATERAL: ICD-10-CM

## (undated) DIAGNOSIS — F41.9 ANXIETY DISORDER, UNSPECIFIED: ICD-10-CM

## (undated) DEVICE — Device: Brand: STABLECUT®

## (undated) DEVICE — 2C14 #2 PDO 45 X 45: Brand: 2C14 #2 PDO 45 X 45

## (undated) DEVICE — SUTURE ETHIBOND 5 CCS

## (undated) DEVICE — TOTAL HIP CDS: Brand: MEDLINE INDUSTRIES, INC.

## (undated) DEVICE — SOL  .9 1000ML BTL

## (undated) DEVICE — SOL  .9 3000ML

## (undated) DEVICE — STOCKINETTE HYDROMED 8X6

## (undated) DEVICE — BOWL CEMENT MIX QUICK-VAC

## (undated) DEVICE — STERILE POLYISOPRENE POWDER-FREE SURGICAL GLOVES: Brand: PROTEXIS

## (undated) DEVICE — PLASTC TOOMEY SYRNG DISP

## (undated) DEVICE — GLOVE SURG SENSICARE SZ 8-1/2

## (undated) DEVICE — SUTURE ETHIBOND 1 OS-6

## (undated) DEVICE — SUTURE VICRYL 0 CP-1

## (undated) DEVICE — PIN STEINMAN SMOOTH 1/8 9

## (undated) DEVICE — DECANTER BAG 9": Brand: MEDLINE INDUSTRIES, INC.

## (undated) DEVICE — WRAP COOLING KNEE W/ICE PILLOW

## (undated) DEVICE — 3M™ STERI-DRAPE™ U-DRAPE 1015: Brand: STERI-DRAPE™

## (undated) DEVICE — CHLORAPREP 26ML APPLICATOR

## (undated) DEVICE — MLPD DISPOSABLE PAD (6' ROLL) 3 ROLLS: Brand: SCHAERER MEDICAL USA

## (undated) DEVICE — KENDALL SCD EXPRESS SLEEVES, KNEE LENGTH, MEDIUM: Brand: KENDALL SCD

## (undated) DEVICE — SUTURE VICRYL 2-0 FSL

## (undated) DEVICE — SPECIMEN CONTAINER,POSITIVE SEAL INDICATOR, OR PACKAGED: Brand: PRECISION

## (undated) DEVICE — ZIMMER® STERILE DISPOSABLE TOURNIQUET CUFF WITH PLC, DUAL PORT, SINGLE BLADDER, 34 IN. (86 CM)

## (undated) DEVICE — GAMMEX® PI HYBRID SIZE 8.5, STERILE POWDER-FREE SURGICAL GLOVE, POLYISOPRENE AND NEOPRENE BLEND: Brand: GAMMEX

## (undated) DEVICE — TOTAL KNEE CDS: Brand: MEDLINE INDUSTRIES, INC.

## (undated) DEVICE — DRESSING AQUACEL AG 3.5 X 10

## (undated) DEVICE — DRESSING AQUACEL AG 3.5X12

## (undated) NOTE — Clinical Note
FYI, TCM template completed. TCM appt on 9/8/2022. Pt will call office if his bp starts to get higher.

## (undated) NOTE — LETTER
08/31/21    Maday Munguia      To Whom It May Concern: The above patient was seen at [facility] for treatment of a medical condition from 8/30/2021-8/31/2021. The patient may return to work on  9/2/2021 without any limitations.      Sincerely

## (undated) NOTE — LETTER
05/03/21        Riley Munguia  [address]  [place] [address]      Dear Pamela Munoz,    [address] [facility] records indicate that you have outstanding lab work and or testing that was ordered for you and has not yet been completed:  Orders Placed This Encounter

## (undated) NOTE — LETTER
03/24/21        Alisa Munguia  [address]  [place] [address]      Dear Marquis Rowley,    [address] [facility] records indicate that you have outstanding lab work and or testing that was ordered for you and has not yet been completed:  Orders Placed This Encounter

## (undated) NOTE — LETTER
10/28/19        Jensen Munguia  [address]  [place] [address]      Dear Gordon Nicole,    [address] [facility] records indicate that you have outstanding lab work and or testing that was ordered for you and has not yet been completed:  Orders Placed This Encounter

## (undated) NOTE — Clinical Note
MONETI. Patient completed TCM. Patient does not have HFU appt scheduled at this time. NCM attempted to schedule TCM/HFU however, no availability within the timeframe recommended. TE to PCP office re: appointment. Thank you.

## (undated) NOTE — LETTER
[facility] Testing Department  Phone: [phone]  Right Fax: [phone]  ABNORMAL VALUES FAX    Sent By:  Emil Wilson Rn Date: 4/16/18    Patient Name: Vee Minus  Surgery Date: 4/27/2018    CSN: [id number]  Medical Record: [id number]

## (undated) NOTE — LETTER
Date: 4/13/2020    Patient Name: Faby Hartmann          To Whom it may concern: This letter has been written at the patient's request. The above patient was seen at the [facility] for treatment of a medical condition.     This patient s

## (undated) NOTE — ED AVS SNAPSHOT
Nelly Chance   MRN: [id number]    Department:  [facility] Emergency Department   Date of Visit:  7/25/2019           Disclosure     Insurance plans vary and the physician(s) referred by the ER may not be covered by your plan.  Please contact y tell this physician (or your personal doctor if your instructions are to return to your personal doctor) about any new or lasting problems. The primary care or specialist physician will see patients referred from the [facility] Emergency Department.  Meghan Hein

## (undated) NOTE — ED AVS SNAPSHOT
Livier Kang   MRN: [id number]    Department:  [facility] Emergency Department   Date of Visit:  8/16/2018           Disclosure     Insurance plans vary and the physician(s) referred by the ER may not be covered by your plan.  Please contact y tell this physician (or your personal doctor if your instructions are to return to your personal doctor) about any new or lasting problems. The primary care or specialist physician will see patients referred from the [facility] Emergency Department.  Osvaldo Barrios

## (undated) NOTE — LETTER
Last Revised 02/07/06  Obstructive Sleep Apnea Questionnaire    Clinical signs and symptoms suggesting the possibility of ZEB    1. Predisposing physical characteristics (positive with any of the following present)  ? BMI 35kg/m²  ?  Craniofacial abnormalit pauses which are frightening to the observer, patient regularly falls asleep within minutes after being left unstimulated) in which case they should be treated as though they have severe sleep apnea.     The sleep laboratory’s assessment (none, mild, modera Point Total for B           C. Requirement for postoperative opioids.                Opioid requirement             Points   None 0    Low dose oral opiod 1    High dose oral opioids, parenteral or neuraxial opiods 3      Point Total for C        Estimation

## (undated) NOTE — IP AVS SNAPSHOT
Patient Demographics     Address  [address] [place] [address]  [place] [address] Phone  [phone] [facility])  [phone] (Work)  [phone] (Mobile) *Preferred* E-mail Address  [email or web address]      Emergency Contact(s)     Name Relation Home Work Mobile    Tayla ? For knee replacement surgery, follow instructions provided by physical therapy. ? Do NOT put a pillow under your knee as it may be more difficult to straighten afterwards. No smoking  ? Avoid smoking.  It is known to cause breathing problems and can d ? Surgical discomfort is normal for one to two months. ? Have realistic goals and keep a positive outlook. ? You may need pain medication regularly (every 4-6 hours) the first 2 weeks and then begin to decrease how often you are taking it.   ? Take pain m Prevention of infection and promotion of healing  ? Good hand washing is important. Everyone should wash their hands or use hand  as soon as they walk in your house-whether they live there or are visiting. ?  Keep bed linen/clothing freshly launde ? Increased pain at incision not relieved by pain medication. Signs of blood clot  ? Pain, excessive tenderness, redness, or swelling in leg or calf (other than incision site).   (CALL SURGEON)      Go directly to the ER or CALL 911 if  you:  ? b Clay Glover MD. Schedule an appointment as soon as possible for a visit in 2 weeks.     Specialty:  SURGERY, ORTHOPEDIC  Why:  follow up visit  Contact information:  [address] [facility] [phone]             Lorelei Yanez Take 100 mg by mouth daily.                 Where to Get Your Medications      Please  your prescriptions at the location directed by your doctor or nurse    Bring a paper prescription for each of these medications  apixaban 2.5 MG Tabs  HYDROcodone #2) 04/28/18 1404 Given      [id number] morphINE sulfate (PF) 4 MG/ML injection 2 mg 04/28/18 1549 Given      [id number] oxyCODONE HCl (OXY-IR) cap/tab 10 mg (Or Linked Group #1) 04/28/18 1259 Given      [id number] oxyCODONE HCl (OXY-IR) cap/tab 15 mg ([facility] None         H&P - H&P Note      H&P signed by Jaya Brown MD at 4/12/2018  9:16 AM   Version 1 of 1    Author:  Jaay Brown MD Service:  (none) Author Type:  Physician    Filed:  4/12/2018  9:16 AM Status:  Signed    :  Jaya Brown MD ( PAST MEDICAL HISTORY:  Osteoarthritis, asthma, hypertension, sleep apnea, CPAP, anxiety, visual impairment, use of eyeglasses. PAST SURGICAL HISTORY:  Right carpal tunnel release, abdominal surgery, left knee replacement.     MEDICATIONS:  Xanax, lisinop Consults signed by Kimberly Charles DO at 4/27/2018  8:37 PM     Author:  Kimberly Charles DO Service:  Hospitalist Author Type:  Physician    Filed:  4/27/2018  8:37 PM Date of Service:  4/27/2018  5:44 PM Status:  Signed    : • Unspecified essential hypertension    • Unspecified sleep apnea     uses CPAP   • Visual impairment     glasses        Past Surgical History: Past Surgical History:  No date: KNEE REPLACEMENT SURGERY  No date: OTHER SURGICAL HISTORY      Comment: Right h Neurologic: No focal neurological deficits. CNII-XII grossly intact. Musculoskeletal: Moves all extremities. Extremities: No edema or cyanosis. Integument: No rashes or lesions. Psychiatric: Appropriate mood and affect.       Diagnostic Data:      Labs Room Number: 360/360-A     Session: 1 and 2[DM. 1]   Number of Visits to Meet Established Goals: 5    Presenting Problem: S/P right TKR[DM.2]    Problem List[DM. 1]  Active Problems:    Essential hypertension    Gastroesophageal reflux disease without esopha ACTIVITY TOLERANCE  Room air  No shortness of breath    AM-PAC '6-Clicks' INPATIENT SHORT FORM - BASIC MOBILITY  How much difficulty does the patient currently have. ..[DM.1]  -   Turning over in bed (including adjusting bedclothes, sheets and blankets)?: N SLR 10 reps 15 reps   Sitting Knee Flexion 10 reps 15 reps   Standing heel/toe raises 10 reps 15 reps   Standing knee flexion 10 reps 15 reps   Extension stretch  1x 1x     Comments: Pt participated in group session, tolerance was good.    was present Physical Therapy Note signed by Cedric Dixon PT at 4/28/2018  2:33 PM  Version 1 of 1    Author:  Cedric Dixon PT Service:  (none) Author Type:  Physical Therapist    Filed:  4/28/2018  2:33 PM Date of Service:  4/28/2018  2:23 PM Status:  Hugo Lees Type of Home: House   Home Layout: Two level  Stairs to Enter : 2  Railing: Yes          Lives With: Spouse  Drives: Yes     Patient Regularly Uses: Glasses    Prior Level of [place]:  The patient reports prior to admission he was independent with ADLs PT Approx Degree of Impairment Score: 35.83%   Standardized Score (AM-PAC Scale): 47.67   CMS Modifier (G-Code): CJ    FUNCTIONAL ABILITY STATUS  Gait Assessment   Gait Assistance: Minimum assistance  Distance (ft): 150  Assistive Device: Rolling walker  P patient is demonstrating a[DM.1] 35. 83[DM.2]% degree of impairment in mobility. Research supports that patients with this level of impairment may benefit from[DM.1] discharge to home with home health care[DM.2].       PLAN  PT Treatment Plan: Bed mobility;E Reason for Therapy:  ADL/IADL Dysfunction and Discharge Planning    History related to current admission: Pt was admitted on 4/27 for R TKR. History of anxiety, HTN, depression, COPD, and L TKR in 2014.       Problem List  Active Problems:    Essential hyp Fall Risk: High fall risk    WEIGHT BEARING RESTRICTION  Weight Bearing Restriction: R lower extremity        R Lower Extremity: Weight Bearing as Tolerated       PAIN ASSESSMENT  Ratin  Location: R knee  Management Techniques:  Activity promotion    CO medical history and occupational profile noted above. Functional outcome measures completed include AM-PAC, ROM, and MMT. Pt is performing ADL safely at supervision level. Recommend discharge home. Family will assist with household tasks.     Patient Compl

## (undated) NOTE — LETTER
07/27/20        Sole Munguia  [address] [place] [address]  [place] [address] [facility] [id number]      Dear Nika Mishra,    [facility] records indicate that you have outstanding lab work and or testing that was ordered for you and has not yet been completed:  Orders Placed This Encounter

## (undated) NOTE — IP AVS SNAPSHOT
[address]            (For Outpatient Use Only) Initial Admit Date: 4/27/2018   Inpt/Obs Admit Date: Inpt: 4/27/18 / Obs: N/A   Discharge Date:    Tammy Jackson:  [de-identified]   MRN: [de-identified]   CSN: [id number]        [facility] Hospital Account Financial Class: [facility]    April 29, 2018